# Patient Record
Sex: FEMALE | Race: BLACK OR AFRICAN AMERICAN | NOT HISPANIC OR LATINO | Employment: FULL TIME | URBAN - METROPOLITAN AREA
[De-identification: names, ages, dates, MRNs, and addresses within clinical notes are randomized per-mention and may not be internally consistent; named-entity substitution may affect disease eponyms.]

---

## 2022-12-13 ENCOUNTER — HOSPITAL ENCOUNTER (EMERGENCY)
Facility: HOSPITAL | Age: 22
Discharge: HOME | End: 2022-12-13
Attending: EMERGENCY MEDICINE

## 2022-12-13 ENCOUNTER — APPOINTMENT (EMERGENCY)
Dept: RADIOLOGY | Facility: HOSPITAL | Age: 22
End: 2022-12-13

## 2022-12-13 VITALS
RESPIRATION RATE: 16 BRPM | WEIGHT: 225 LBS | SYSTOLIC BLOOD PRESSURE: 131 MMHG | HEIGHT: 64 IN | OXYGEN SATURATION: 100 % | HEART RATE: 65 BPM | BODY MASS INDEX: 38.41 KG/M2 | TEMPERATURE: 98.5 F | DIASTOLIC BLOOD PRESSURE: 75 MMHG

## 2022-12-13 DIAGNOSIS — M62.838 MUSCLE SPASM: Primary | ICD-10-CM

## 2022-12-13 RX ORDER — CYCLOBENZAPRINE HCL 10 MG
10 TABLET ORAL 2 TIMES DAILY PRN
Qty: 20 TABLET | Refills: 0 | Status: SHIPPED | OUTPATIENT
Start: 2022-12-13

## 2022-12-13 RX ORDER — IBUPROFEN 400 MG/1
400 TABLET ORAL ONCE
Status: COMPLETED | OUTPATIENT
Start: 2022-12-13 | End: 2022-12-13

## 2022-12-13 RX ORDER — METHOCARBAMOL 500 MG/1
500 TABLET, FILM COATED ORAL ONCE
Status: COMPLETED | OUTPATIENT
Start: 2022-12-13 | End: 2022-12-13

## 2022-12-13 RX ADMIN — METHOCARBAMOL 500 MG: 500 TABLET ORAL at 14:56

## 2022-12-13 RX ADMIN — IBUPROFEN 400 MG: 400 TABLET, FILM COATED ORAL at 14:56

## 2022-12-13 NOTE — Clinical Note
Bronson Barajas was seen and treated in our emergency department on 12/13/2022  No restrictions            Diagnosis:     Carito Negron  may return to school on return date  She may return on this date: If you have any questions or concerns, please don't hesitate to call        Yaw Donis PA-C    ______________________________           _______________          _______________  Hospital Representative                              Date                                Time

## 2022-12-15 NOTE — ED PROVIDER NOTES
History  Chief Complaint   Patient presents with   • Neck Pain     Pt presents with right sided neck pain that started yesterday  States she was in the shower when she felt a "shooting pain" down right side of neck  Denies injury  Denies headache  Took two dose of 1000mg of tylenol today at 9am and 11am  Denies headache     66-year-old female presenting with neck pain on the right side of her neck the last 36 hours  Patient states that she took Tylenol which seemed to kind of help but states that is not going away  Patient states that she was recently on a plane for an extended amount of time and had her neck in an odd position which she thinks is causing the pain  Denies any midline tenderness or any traumas or car accidents  Patient states that she felt a mild discomfort last night but woke up this morning with much worse pain  Nuys any fevers, shortness of breath, chest pain, sore throat, headache, dental pain, or any other symptoms at this time  None       History reviewed  No pertinent past medical history  History reviewed  No pertinent surgical history  History reviewed  No pertinent family history  I have reviewed and agree with the history as documented  E-Cigarette/Vaping     E-Cigarette/Vaping Substances     Social History     Tobacco Use   • Smoking status: Never   • Smokeless tobacco: Never   Substance Use Topics   • Alcohol use: Never   • Drug use: Never       Review of Systems   Constitutional: Negative for chills and fever  HENT: Negative for ear pain and sore throat  Eyes: Negative for pain and visual disturbance  Respiratory: Negative for cough and shortness of breath  Cardiovascular: Negative for chest pain and palpitations  Gastrointestinal: Negative for abdominal pain, constipation, diarrhea, nausea and vomiting  Genitourinary: Negative for dysuria and hematuria  Musculoskeletal: Positive for neck pain   Negative for arthralgias, back pain, gait problem, joint swelling, myalgias and neck stiffness  Skin: Negative for color change and rash  Neurological: Negative for dizziness, seizures, syncope, weakness, light-headedness, numbness and headaches  All other systems reviewed and are negative  Physical Exam  Physical Exam  Vitals and nursing note reviewed  Constitutional:       General: She is not in acute distress  Appearance: She is well-developed  HENT:      Head: Normocephalic and atraumatic  Eyes:      Conjunctiva/sclera: Conjunctivae normal    Neck:      Vascular: No carotid bruit  Comments: Pain with movements  Cardiovascular:      Rate and Rhythm: Normal rate and regular rhythm  Heart sounds: No murmur heard  Pulmonary:      Effort: Pulmonary effort is normal  No respiratory distress  Breath sounds: Normal breath sounds  No stridor  No wheezing, rhonchi or rales  Chest:      Chest wall: No tenderness  Abdominal:      Palpations: Abdomen is soft  Tenderness: There is no abdominal tenderness  Musculoskeletal:         General: No swelling  Cervical back: Normal range of motion and neck supple  Tenderness present  No rigidity  Right lower leg: No edema  Left lower leg: No edema  Lymphadenopathy:      Cervical: No cervical adenopathy  Skin:     General: Skin is warm and dry  Capillary Refill: Capillary refill takes less than 2 seconds  Coloration: Skin is not jaundiced or pale  Findings: No bruising, erythema, lesion or rash  Neurological:      Mental Status: She is alert     Psychiatric:         Mood and Affect: Mood normal          Vital Signs  ED Triage Vitals [12/13/22 1348]   Temperature Pulse Respirations Blood Pressure SpO2   98 5 °F (36 9 °C) 65 16 131/75 100 %      Temp src Heart Rate Source Patient Position - Orthostatic VS BP Location FiO2 (%)   -- Monitor Sitting Right arm --      Pain Score       10 - Worst Possible Pain           Vitals:    12/13/22 1348   BP: 131/75 Pulse: 65   Patient Position - Orthostatic VS: Sitting         Visual Acuity      ED Medications  Medications   methocarbamol (ROBAXIN) tablet 500 mg (500 mg Oral Given 12/13/22 1456)   ibuprofen (MOTRIN) tablet 400 mg (400 mg Oral Given 12/13/22 1456)       Diagnostic Studies  Results Reviewed     None                 XR spine cervical 2 or 3 vw injury   ED Interpretation by Twila Culver PA-C (12/13 5461)   No acute fractures      Final Result by Elda Bui MD (12/14 6937)      No acute osseous abnormality  Workstation performed: [id number]                    Procedures  Procedures         ED Course                                             MDM  Number of Diagnoses or Management Options  Muscle spasm  Diagnosis management comments: 6year-old female presenting with right-sided neck pain the last 24 to 36 hours  After not playing and having her neck and where position for  Amount of time  Straight to rule out fractures  Trays showed no acute fractures  Has tense muscles on the right side of her neck to the left side and is painful to palpation  Worse and only specific movements towards the right side  Patient states that she feels much better asked to her Robaxin and ibuprofen  Will prescribe patient these medications for the home  Patient's vitals, lab/imaging results, diagnosis, and treatment plan were discussed with the patient  All new/changed medications were discussed with patient, specifically, route of administration, how often and when to take, and where they can be picked up  Strict return precautions as well as close follow up with PCP was discussed with the patient and the patient was agreeable to my recommendations  Patient verbally acknowledged understanding of the above communications        Disposition  Final diagnoses:   Muscle spasm     Time reflects when diagnosis was documented in both MDM as applicable and the Disposition within this note     Time User Action Codes Description Comment    12/13/2022  3:57 PM Oscar Rodgers Add [E50 238] Muscle spasm       ED Disposition     ED Disposition   Discharge    Condition   Stable    Date/Time   Tue Dec 13, 2022  3:55 PM    Comment   Reina Martínez discharge to home/self care  Follow-up Information    None         Discharge Medication List as of 12/13/2022  4:00 PM      START taking these medications    Details   cyclobenzaprine (FLEXERIL) 10 mg tablet Take 1 tablet (10 mg total) by mouth 2 (two) times a day as needed for muscle spasms, Starting Tue 12/13/2022, Normal             No discharge procedures on file      PDMP Review     None          ED Provider  Electronically Signed by           Debra Lucio PA-C  12/14/22 8688

## 2024-12-17 ENCOUNTER — APPOINTMENT (OUTPATIENT)
Dept: RADIOLOGY | Facility: HOSPITAL | Age: 24
End: 2024-12-17

## 2024-12-17 ENCOUNTER — HOSPITAL ENCOUNTER (EMERGENCY)
Facility: HOSPITAL | Age: 24
Discharge: HOME | End: 2024-12-17
Attending: EMERGENCY MEDICINE

## 2024-12-17 VITALS
DIASTOLIC BLOOD PRESSURE: 75 MMHG | TEMPERATURE: 98.2 F | OXYGEN SATURATION: 100 % | SYSTOLIC BLOOD PRESSURE: 103 MMHG | WEIGHT: 237.8 LBS | HEIGHT: 64 IN | RESPIRATION RATE: 18 BRPM | BODY MASS INDEX: 40.6 KG/M2 | HEART RATE: 64 BPM

## 2024-12-17 DIAGNOSIS — D64.9 ANEMIA: ICD-10-CM

## 2024-12-17 DIAGNOSIS — R07.9 ACUTE CHEST PAIN: Primary | ICD-10-CM

## 2024-12-17 LAB
ALBUMIN SERPL BCG-MCNC: 4.4 G/DL (ref 3.5–5)
ALP SERPL-CCNC: 51 U/L (ref 34–104)
ALT SERPL W P-5'-P-CCNC: 9 U/L (ref 7–52)
ANION GAP SERPL CALCULATED.3IONS-SCNC: 5 MMOL/L (ref 4–13)
AST SERPL W P-5'-P-CCNC: 14 U/L (ref 13–39)
BASOPHILS # BLD AUTO: 0.02 THOUSANDS/ÂΜL (ref 0–0.1)
BASOPHILS NFR BLD AUTO: 0 % (ref 0–1)
BILIRUB SERPL-MCNC: 0.63 MG/DL (ref 0.2–1)
BUN SERPL-MCNC: 9 MG/DL (ref 5–25)
CALCIUM SERPL-MCNC: 9.2 MG/DL (ref 8.4–10.2)
CARDIAC TROPONIN I PNL SERPL HS: <2 NG/L (ref ?–50)
CARDIAC TROPONIN I PNL SERPL HS: <2 NG/L (ref ?–50)
CHLORIDE SERPL-SCNC: 106 MMOL/L (ref 96–108)
CO2 SERPL-SCNC: 28 MMOL/L (ref 21–32)
CREAT SERPL-MCNC: 0.7 MG/DL (ref 0.6–1.3)
EOSINOPHIL # BLD AUTO: 0.16 THOUSAND/ÂΜL (ref 0–0.61)
EOSINOPHIL NFR BLD AUTO: 2 % (ref 0–6)
ERYTHROCYTE [DISTWIDTH] IN BLOOD BY AUTOMATED COUNT: 12.6 % (ref 11.6–15.1)
EXT PREGNANCY TEST URINE: NEGATIVE
EXT. CONTROL: NORMAL
GFR SERPL CREATININE-BSD FRML MDRD: 121 ML/MIN/1.73SQ M
GLUCOSE SERPL-MCNC: 78 MG/DL (ref 65–140)
HCT VFR BLD AUTO: 33.2 % (ref 34.8–46.1)
HGB BLD-MCNC: 10.7 G/DL (ref 11.5–15.4)
IMM GRANULOCYTES # BLD AUTO: 0.02 THOUSAND/UL (ref 0–0.2)
IMM GRANULOCYTES NFR BLD AUTO: 0 % (ref 0–2)
LYMPHOCYTES # BLD AUTO: 1.92 THOUSANDS/ÂΜL (ref 0.6–4.47)
LYMPHOCYTES NFR BLD AUTO: 27 % (ref 14–44)
MCH RBC QN AUTO: 29.8 PG (ref 26.8–34.3)
MCHC RBC AUTO-ENTMCNC: 32.2 G/DL (ref 31.4–37.4)
MCV RBC AUTO: 93 FL (ref 82–98)
MONOCYTES # BLD AUTO: 0.48 THOUSAND/ÂΜL (ref 0.17–1.22)
MONOCYTES NFR BLD AUTO: 7 % (ref 4–12)
NEUTROPHILS # BLD AUTO: 4.56 THOUSANDS/ÂΜL (ref 1.85–7.62)
NEUTS SEG NFR BLD AUTO: 64 % (ref 43–75)
NRBC BLD AUTO-RTO: 0 /100 WBCS
PLATELET # BLD AUTO: 312 THOUSANDS/UL (ref 149–390)
PMV BLD AUTO: 10 FL (ref 8.9–12.7)
POTASSIUM SERPL-SCNC: 3.8 MMOL/L (ref 3.5–5.3)
PROT SERPL-MCNC: 7.2 G/DL (ref 6.4–8.4)
RBC # BLD AUTO: 3.59 MILLION/UL (ref 3.81–5.12)
SODIUM SERPL-SCNC: 139 MMOL/L (ref 135–147)
WBC # BLD AUTO: 7.16 THOUSAND/UL (ref 4.31–10.16)

## 2024-12-17 PROCEDURE — 81025 URINE PREGNANCY TEST: CPT | Performed by: EMERGENCY MEDICINE

## 2024-12-17 PROCEDURE — 93005 ELECTROCARDIOGRAM TRACING: CPT

## 2024-12-17 PROCEDURE — 99285 EMERGENCY DEPT VISIT HI MDM: CPT | Performed by: EMERGENCY MEDICINE

## 2024-12-17 PROCEDURE — 71045 X-RAY EXAM CHEST 1 VIEW: CPT

## 2024-12-17 PROCEDURE — 99285 EMERGENCY DEPT VISIT HI MDM: CPT

## 2024-12-17 PROCEDURE — 85025 COMPLETE CBC W/AUTO DIFF WBC: CPT | Performed by: EMERGENCY MEDICINE

## 2024-12-17 PROCEDURE — 84484 ASSAY OF TROPONIN QUANT: CPT | Performed by: EMERGENCY MEDICINE

## 2024-12-17 PROCEDURE — 80053 COMPREHEN METABOLIC PANEL: CPT | Performed by: EMERGENCY MEDICINE

## 2024-12-17 PROCEDURE — 96374 THER/PROPH/DIAG INJ IV PUSH: CPT

## 2024-12-17 PROCEDURE — 36415 COLL VENOUS BLD VENIPUNCTURE: CPT

## 2024-12-17 RX ORDER — LIDOCAINE 50 MG/G
1 PATCH TOPICAL ONCE
Status: DISCONTINUED | OUTPATIENT
Start: 2024-12-17 | End: 2024-12-18 | Stop reason: HOSPADM

## 2024-12-17 RX ORDER — KETOROLAC TROMETHAMINE 30 MG/ML
15 INJECTION, SOLUTION INTRAMUSCULAR; INTRAVENOUS ONCE
Status: COMPLETED | OUTPATIENT
Start: 2024-12-17 | End: 2024-12-17

## 2024-12-17 RX ADMIN — LIDOCAINE 1 PATCH: 700 PATCH TOPICAL at 22:23

## 2024-12-17 RX ADMIN — KETOROLAC TROMETHAMINE 15 MG: 30 INJECTION, SOLUTION INTRAMUSCULAR; INTRAVENOUS at 22:22

## 2024-12-18 NOTE — ED PROVIDER NOTES
Time reflects when diagnosis was documented in both MDM as applicable and the Disposition within this note       Time User Action Codes Description Comment    12/17/2024 10:15 PM Melvin Capellan Add [R07.9] Acute chest pain     12/17/2024 10:15 PM IoanadevinMelvin Add [D64.9] Anemia           ED Disposition       ED Disposition   Discharge    Condition   Stable    Date/Time   Tue Dec 17, 2024 10:17 PM    Comment   Abby Hercules discharge to home/self care.                   Assessment & Plan       Medical Decision Making  Patient presents with:  Midsternal chest pain that radiates across her chest.  The chest pain started at 11 AM this morning while lifting light boxes and seasonings.  The pain has been constant all day.  Patient has not taken anything for pain.  Patient rates the pain as a 5 out of 10 and worsens with any type of push-up motion.  Past medical history includes PCOS.  Denies taking any medications.  Patient smokes hookah 2 times a week since she was 18 years old and drinks 1 to 2 glasses of alcohol every weekend.  Denies any illicit substances.  Patient denies any recent sick contacts.    Patient seen and examined noted to have reproducible chest pain with pressure along the sternal border.      Differential diagnosis includes but is not limited to costochondritis, ACS, electrolyte abnormality, pneumonia.      Patient's labs notable for: Unremarkable CBC, CMP, troponin, urine pregnancy, Imaging revealed: No acute cardiopulmonary disease, and EKG: interpreted by myself as above.    Heart Score: 3     Patient appears well, is nontoxic appearing, Abbyaletha Hercules expresses understanding and agrees with plan of care at this time.  In light of this patient would benefit from outpatient management.    Patient was rx'd as below       Amount and/or Complexity of Data Reviewed  Labs: ordered.  Radiology: ordered and independent interpretation performed.    Risk  Prescription drug  management.             Medications   ketorolac (TORADOL) injection 15 mg (15 mg Intravenous Given 12/17/24 2222)       ED Risk Strat Scores   HEART Risk Score      Flowsheet Row Most Recent Value   Heart Score Risk Calculator    History 1 Filed at: 12/17/2024 2215   ECG 1 Filed at: 12/17/2024 2215   Age 0 Filed at: 12/17/2024 2215   Risk Factors 1 Filed at: 12/17/2024 2215   Troponin 0 Filed at: 12/17/2024 2215   HEART Score 3 Filed at: 12/17/2024 2215          HEART Risk Score      Flowsheet Row Most Recent Value   Heart Score Risk Calculator    History 1 Filed at: 12/17/2024 2215   ECG 1 Filed at: 12/17/2024 2215   Age 0 Filed at: 12/17/2024 2215   Risk Factors 1 Filed at: 12/17/2024 2215   Troponin 0 Filed at: 12/17/2024 2215   HEART Score 3 Filed at: 12/17/2024 2215                        PERC Rule for PE      Flowsheet Row Most Recent Value   PERC Rule for PE    Age >=50 0 Filed at: 12/17/2024 2215   HR >=100 0 Filed at: 12/17/2024 2215   O2 Sat on room air < 95% 0 Filed at: 12/17/2024 2215   History of PE or DVT 0 Filed at: 12/17/2024 2215   Recent trauma or surgery 0 Filed at: 12/17/2024 2215   Hemoptysis 0 Filed at: 12/17/2024 2215   Exogenous estrogen 0 Filed at: 12/17/2024 2215   Unilateral leg swelling 0 Filed at: 12/17/2024 2215   PERC Rule for PE Results 0 Filed at: 12/17/2024 2215                Wells' Criteria for PE      Flowsheet Row Most Recent Value   Wells' Criteria for PE    Clinical signs and symptoms of DVT 0 Filed at: 12/17/2024 2215   PE is primary diagnosis or equally likely 0 Filed at: 12/17/2024 2215   HR >100 0 Filed at: 12/17/2024 2215   Immobilization at least 3 days or Surgery in the previous 4 weeks 0 Filed at: 12/17/2024 2215   Previous, objectively diagnosed PE or DVT 0 Filed at: 12/17/2024 2215   Hemoptysis 0 Filed at: 12/17/2024 2215   Malignancy with treatment within 6 months or palliative 0 Filed at: 12/17/2024 2215   Wells' Criteria Total 0 Filed at: 12/17/2024 2215                         History of Present Illness       Chief Complaint   Patient presents with    Chest Pain     Mid sternal chest pain that radiates to her back starting at 1100 this am, reports pain constant all day and tonight after lifting a light box became sharp. No meds today       History reviewed. No pertinent past medical history.   History reviewed. No pertinent surgical history.   History reviewed. No pertinent family history.   Social History     Tobacco Use    Smoking status: Never    Smokeless tobacco: Never   Vaping Use    Vaping status: Some Days   Substance Use Topics    Alcohol use: Yes     Comment: occ    Drug use: Never      E-Cigarette/Vaping    E-Cigarette Use Current Some Day User     Comments hooka       E-Cigarette/Vaping Substances      I have reviewed and agree with the history as documented.     Abby Hercules is a 24 y.o. female     They presented to the emergency department on December 19, 2024. Patient presents with:  Midsternal chest pain that radiates across her chest.  The chest pain started at 11 AM this morning while lifting light boxes and seasonings.  The pain has been constant all day.  Patient has not taken anything for pain.  Patient rates the pain as a 5 out of 10 and worsens with any type of push-up motion.  Past medical history includes PCOS.  Denies taking any medications.  Patient smokes hookah 2 times a week since she was 18 years old and drinks 1 to 2 glasses of alcohol every weekend.  Denies any illicit substances.  Patient denies any recent sick contacts.  Patient denies any fever, chills, cough, shortness of breath, palpitations, nausea, vomiting, diarrhea, constipation, dysuria, polyuria, hematuria, rash or any other complaint at this time.                    Review of Systems   Constitutional:  Negative for chills and fever.   HENT:  Negative for ear pain and sore throat.    Eyes:  Negative for pain and visual disturbance.   Respiratory:  Negative for cough and  shortness of breath.    Cardiovascular:  Positive for chest pain. Negative for palpitations.   Gastrointestinal:  Negative for abdominal pain, constipation, diarrhea, nausea and vomiting.   Genitourinary:  Negative for dysuria and hematuria.   Musculoskeletal:  Negative for arthralgias and back pain.   Skin:  Negative for color change and rash.   Neurological:  Negative for seizures and syncope.   All other systems reviewed and are negative.          Objective       ED Triage Vitals   Temperature Pulse Blood Pressure Respirations SpO2 Patient Position - Orthostatic VS   12/17/24 1736 12/17/24 1733 12/17/24 1736 12/17/24 1733 12/17/24 1733 12/17/24 2045   98.2 °F (36.8 °C) 66 131/70 22 100 % Lying      Temp Source Heart Rate Source BP Location FiO2 (%) Pain Score    12/17/24 1736 12/17/24 2045 12/17/24 2045 -- 12/17/24 1733    Oral Monitor Right arm  8      Vitals      Date and Time Temp Pulse SpO2 Resp BP Pain Score FACES Pain Rating User   12/17/24 2045 -- 64 100 % 18 103/75 6 -- JR   12/17/24 1736 98.2 °F (36.8 °C) -- -- -- 131/70 -- -- VB   12/17/24 1733 -- 66 100 % 22 -- 8 -- VB            Physical Exam  Vitals and nursing note reviewed.   Constitutional:       General: She is not in acute distress.     Appearance: She is well-developed.   HENT:      Head: Normocephalic and atraumatic.   Eyes:      Conjunctiva/sclera: Conjunctivae normal.   Cardiovascular:      Rate and Rhythm: Normal rate and regular rhythm.      Heart sounds: Normal heart sounds. Heart sounds not distant. No murmur heard.     No systolic murmur is present.      No diastolic murmur is present.      No friction rub. No gallop. No S3 or S4 sounds.      Comments: Chest pain is reproducible with pressure along the sternal border.  Pulmonary:      Effort: Pulmonary effort is normal. No respiratory distress.      Breath sounds: Normal breath sounds. No decreased breath sounds, wheezing, rhonchi or rales.   Abdominal:      Palpations: Abdomen is soft.       Tenderness: There is no abdominal tenderness.   Musculoskeletal:         General: No swelling.      Cervical back: Neck supple.   Skin:     General: Skin is warm and dry.      Capillary Refill: Capillary refill takes less than 2 seconds.   Neurological:      Mental Status: She is alert.   Psychiatric:         Mood and Affect: Mood normal.         Results Reviewed       Procedure Component Value Units Date/Time    POCT pregnancy, urine [[id number]]  (Normal) Collected: 12/17/24 2219    Lab Status: Final result Updated: 12/17/24 2220     EXT Preg Test, Ur Negative     Control Valid    HS Troponin I 2hr [[id number]] Collected: 12/17/24 1952    Lab Status: Final result Specimen: Blood from Arm, Left Updated: 12/17/24 2028     hs TnI 2hr <2 ng/L      Delta 2hr hsTnI --    HS Troponin 0hr (reflex protocol) [[id number]]  (Normal) Collected: 12/17/24 1742    Lab Status: Final result Specimen: Blood from Arm, Left Updated: 12/17/24 1809     hs TnI 0hr <2 ng/L     Comprehensive metabolic panel [[id number]] Collected: 12/17/24 1742    Lab Status: Final result Specimen: Blood from Arm, Left Updated: 12/17/24 1802     Sodium 139 mmol/L      Potassium 3.8 mmol/L      Chloride 106 mmol/L      CO2 28 mmol/L      ANION GAP 5 mmol/L      BUN 9 mg/dL      Creatinine 0.70 mg/dL      Glucose 78 mg/dL      Calcium 9.2 mg/dL      AST 14 U/L      ALT 9 U/L      Alkaline Phosphatase 51 U/L      Total Protein 7.2 g/dL      Albumin 4.4 g/dL      Total Bilirubin 0.63 mg/dL      eGFR 121 ml/min/1.73sq m     Narrative:      National Kid[employer] Disease Foundation guidelines for Chronic Kidney Disease (CKD):     Stage 1 with normal or high GFR (GFR > 90 mL/min/1.73 square meters)    Stage 2 Mild CKD (GFR = 60-89 mL/min/1.73 square meters)    Stage 3A Moderate CKD (GFR = 45-59 mL/min/1.73 square meters)    Stage 3B Moderate CKD (GFR = 30-44 mL/min/1.73 square meters)    Stage 4 Severe CKD (GFR = 15-29 mL/min/1.73 square meters)    Stage 5 End Stage  CKD (GFR <15 mL/min/1.73 square meters)  Note: GFR calculation is accurate only with a steady state creatinine    CBC and differential [[id number]]  (Abnormal) Collected: 12/17/24 1742    Lab Status: Final result Specimen: Blood from Arm, Left Updated: 12/17/24 1748     WBC 7.16 Thousand/uL      RBC 3.59 Million/uL      Hemoglobin 10.7 g/dL      Hematocrit 33.2 %      MCV 93 fL      MCH 29.8 pg      MCHC 32.2 g/dL      RDW 12.6 %      MPV 10.0 fL      Platelets 312 Thousands/uL      nRBC 0 /100 WBCs      Segmented % 64 %      Immature Grans % 0 %      Lymphocytes % 27 %      Monocytes % 7 %      Eosinophils Relative 2 %      Basophils Relative 0 %      Absolute Neutrophils 4.56 Thousands/µL      Absolute Immature Grans 0.02 Thousand/uL      Absolute Lymphocytes 1.92 Thousands/µL      Absolute Monocytes 0.48 Thousand/µL      Eosinophils Absolute 0.16 Thousand/µL      Basophils Absolute 0.02 Thousands/µL             XR chest 1 view portable   ED Interpretation by Melvin Capellan MD (12/17 2216)   Per my independent interpretation: No acute cardiopulmonary process      Final Interpretation by Bird Myers MD (12/18 [id number])      No acute cardiopulmonary disease.            Workstation performed: [id number]             ECG 12 Lead Documentation Only    Date/Time: 12/17/2024 5:55 PM    Performed by: Jesus Bauer MD  Authorized by: Jesus Bauer MD    Indications / Diagnosis:  Chest pain  ECG reviewed by me, the ED Provider: yes    Patient location:  ED  Previous ECG:     Previous ECG:  Unavailable    Comparison to cardiac monitor: No    Interpretation:     Interpretation: non-specific    Rate:     ECG rate:  60    ECG rate assessment: normal    Rhythm:     Rhythm: sinus rhythm      Rhythm comment:  Sinus rhythm with sinus arrhythmia  Ectopy:     Ectopy: none    QRS:     QRS axis:  Normal    QRS intervals:  Normal  Conduction:     Conduction: normal    ST segments:     ST segments:  Normal  T waves:     T waves: normal         ED Medication and Procedure Management   Prior to Admission Medications   Prescriptions Last Dose Informant Patient Reported? Taking?   cyclobenzaprine (FLEXERIL) 10 mg tablet   No No   Sig: Take 1 tablet (10 mg total) by mouth 2 (two) times a day as needed for muscle spasms      Facility-Administered Medications: None     Discharge Medication List as of 12/17/2024 10:17 PM        CONTINUE these medications which have NOT CHANGED    Details   cyclobenzaprine (FLEXERIL) 10 mg tablet Take 1 tablet (10 mg total) by mouth 2 (two) times a day as needed for muscle spasms, Starting Tue 12/13/2022, Normal             ED SEPSIS DOCUMENTATION   Time reflects when diagnosis was documented in both MDM as applicable and the Disposition within this note       Time User Action Codes Description Comment    12/17/2024 10:15 PM Melvin Capellan Add [R07.9] Acute chest pain     12/17/2024 10:15 PM Melvin Capellan Add [D64.9] Anemia                  Jesus Bauer MD  12/19/24 7401

## 2024-12-18 NOTE — ED ATTENDING ATTESTATION
12/17/2024  I, Melvin Capellan MD, saw and evaluated the patient. I have discussed the patient with the resident/non-physician practitioner and agree with the resident's/non-physician practitioner's findings, Plan of Care, and MDM as documented in the resident's/non-physician practitioner's note, except where noted. All available labs and Radiology studies were reviewed.  I was present for key portions of any procedure(s) performed by the resident/non-physician practitioner and I was immediately available to provide assistance.       At this point I agree with the current assessment done in the Emergency Department.  I have conducted an independent evaluation of this patient a history and physical is as follows:    History    Patient is a 24-year-old female, with a history significant for vape use per my review the medical record, who presents to the ED today for evaluation of atraumatic chest pain that began at 11 AM today.  The pain has been constant since its onset.  The chest pain is nonexertional, nonpleuritic, not worsened with laying back.  There is no associated fever, recent URIs, dyspnea, abdominal pain, weakness, numbness, drug use, history of VTE, hemoptysis, recent trauma or surgery, contraceptive use.  Patient states that she was lifting something when her pain occurred.  Movement exacerbates her discomfort.  Patient has not taken anything to remit her symptoms.  Patient's mother, present in room providing collateral history, states there is family history of cardiac disease and patient is not confused.    Patient is without other concerns at this time.     ROS  Patient denies: Fever; dysphagia; vision change; dyspnea; abdominal pain; polyuria; dysuria; rash; weakness; numbness; difficulty walking; confusion    Physical Exam    GENERAL APPEARANCE: NAD  NEURO: Patient is speaking clearly in complete sentences.  Patient is answering appropriately and able follow commands.  Patient is moving  all four extremities spontaneously.  No facial droop.  Tongue midline.  HEENT: PERRL, Moist mucous membranes, external ears normal, nose normal  Neck: No cervical adenopathy  CV: RRR. No murmurs, rubs, gallops.  2+ radial and DP pulses bilaterally.  LUNGS: Clear to auscultation: No wheezes, stridor, rhonchi, rales  GI: Abdomen non-distended. Soft. Non-tender and without rebound or guarding   : Deferred at this time  MSK: Tenderness to palpation over the right chest wall.  No deformity.  No lower extremity edema.  No pain with calf squeeze.  Skin: Warm and dry  Capillary refill: <2 seconds    Patient is currently afebrile and hemodynamically stable.    Assessment/Plan/MDM  Chest pain  -This presentation is concerning for: ACS, pneumothorax, sprain, strain.  No reason to suspect aortic dissection, pericarditis, myocarditis, PE based on history, physical exam, PERC/Wells scores of 0.  -Will investigate with cardiac workup, pregnancy test  -Will manage with multimodal analgesia and further based on workup    ED Course  ED Course as of 12/17/24 2320   Tue Dec 17, 2024   2202 ECG per my independent interpretation: Normal rate, regular rhythm, no ectopy, normal axis, no ST elevations or depressions     2216 Patient states she does not have a PCP.  Referral provided   2220 PREGNANCY TEST URINE: Negative  WNL   2320 In regards to the anemia, patient denies blood in stool/hematuria.  She states that she recently finished her last menstrual cycle.         Critical Care Time  Procedures

## 2024-12-20 LAB
ATRIAL RATE: 60 BPM
P AXIS: 39 DEGREES
PR INTERVAL: 152 MS
QRS AXIS: 68 DEGREES
QRSD INTERVAL: 84 MS
QT INTERVAL: 414 MS
QTC INTERVAL: 414 MS
T WAVE AXIS: 4 DEGREES
VENTRICULAR RATE: 60 BPM

## 2024-12-20 PROCEDURE — 93010 ELECTROCARDIOGRAM REPORT: CPT | Performed by: INTERNAL MEDICINE

## 2025-01-15 ENCOUNTER — OFFICE VISIT (OUTPATIENT)
Dept: FAMILY MEDICINE CLINIC | Facility: CLINIC | Age: 25
End: 2025-01-15
Payer: COMMERCIAL

## 2025-01-15 VITALS
HEART RATE: 71 BPM | BODY MASS INDEX: 40.05 KG/M2 | WEIGHT: 234.6 LBS | RESPIRATION RATE: 16 BRPM | TEMPERATURE: 97.9 F | OXYGEN SATURATION: 100 % | HEIGHT: 64 IN | DIASTOLIC BLOOD PRESSURE: 73 MMHG | SYSTOLIC BLOOD PRESSURE: 134 MMHG

## 2025-01-15 DIAGNOSIS — Z11.4 SCREENING FOR HIV (HUMAN IMMUNODEFICIENCY VIRUS): ICD-10-CM

## 2025-01-15 DIAGNOSIS — D64.9 ANEMIA, UNSPECIFIED TYPE: ICD-10-CM

## 2025-01-15 DIAGNOSIS — Z11.59 NEED FOR HEPATITIS C SCREENING TEST: ICD-10-CM

## 2025-01-15 DIAGNOSIS — Z23 NEED FOR VACCINATION: ICD-10-CM

## 2025-01-15 DIAGNOSIS — Z11.3 SCREENING FOR STDS (SEXUALLY TRANSMITTED DISEASES): ICD-10-CM

## 2025-01-15 DIAGNOSIS — Z13.1 SCREENING FOR DIABETES MELLITUS: ICD-10-CM

## 2025-01-15 DIAGNOSIS — Z00.00 ANNUAL PHYSICAL EXAM: Primary | ICD-10-CM

## 2025-01-15 DIAGNOSIS — E04.1 NODULAR THYROID DISEASE: ICD-10-CM

## 2025-01-15 DIAGNOSIS — E66.01 MORBID OBESITY WITH BMI OF 40.0-44.9, ADULT (HCC): ICD-10-CM

## 2025-01-15 DIAGNOSIS — R07.9 ACUTE CHEST PAIN: ICD-10-CM

## 2025-01-15 DIAGNOSIS — E28.2 PCOS (POLYCYSTIC OVARIAN SYNDROME): ICD-10-CM

## 2025-01-15 DIAGNOSIS — Z12.4 SCREENING FOR CERVICAL CANCER: ICD-10-CM

## 2025-01-15 PROBLEM — Z30.42 ENCOUNTER FOR SURVEILLANCE OF INJECTABLE CONTRACEPTIVE: Status: ACTIVE | Noted: 2021-02-23

## 2025-01-15 PROBLEM — Z30.09 GENERAL COUNSELING AND ADVICE FOR CONTRACEPTIVE MANAGEMENT: Status: ACTIVE | Noted: 2018-07-19

## 2025-01-15 PROBLEM — R10.2 PELVIC PAIN IN FEMALE: Status: ACTIVE | Noted: 2021-12-02

## 2025-01-15 PROCEDURE — 99385 PREV VISIT NEW AGE 18-39: CPT | Performed by: FAMILY MEDICINE

## 2025-01-15 PROCEDURE — 99204 OFFICE O/P NEW MOD 45 MIN: CPT | Performed by: FAMILY MEDICINE

## 2025-01-15 NOTE — PATIENT INSTRUCTIONS
"Patient Education     Routine physical for adults   The Basics   Written by the doctors and editors at [facility]   What is a physical? -- A physical is a routine visit, or \"check-up,\" with your doctor. You might also hear it called a \"wellness visit\" or \"preventive visit.\"  During each visit, the doctor will:   Ask about your physical and mental health   Ask about your habits, behaviors, and lifestyle   Do an exam   Give you vaccines if needed   Talk to you about any medicines you take   Give advice about your health   Answer your questions  Getting regular check-ups is an important part of taking care of your health. It can help your doctor find and treat any problems you have. But it's also important for preventing health problems.  A routine physical is different from a \"sick visit.\" A sick visit is when you see a doctor because of a health concern or problem. Since physicals are scheduled ahead of time, you can think about what you want to ask the doctor.  How often should I get a physical? -- It depends on your age and health. In general, for people age 21 years and older:   If you are younger than 50 years, you might be able to get a physical every 3 years.   If you are 50 years or older, your doctor might recommend a physical every year.  If you have an ongoing health condition, like diabetes or high blood pressure, your doctor will probably want to see you more often.  What happens during a physical? -- In general, each visit will include:   Physical exam - The doctor or nurse will check your height, weight, heart rate, and blood pressure. They will also look at your eyes and ears. They will ask about how you are feeling and whether you have any symptoms that bother you.   Medicines - It's a good idea to bring a list of all the medicines you take to each doctor visit. Your doctor will talk to you about your medicines and answer any questions. Tell them if you are having any side effects that bother you. You " "should also tell them if you are having trouble paying for any of your medicines.   Habits and behaviors - This includes:   Your diet   Your exercise habits   Whether you smoke, drink alcohol, or use drugs   Whether you are sexually active   Whether you feel safe at home  Your doctor will talk to you about things you can do to improve your health and lower your risk of health problems. They will also offer help and support. For example, if you want to quit smoking, they can give you advice and might prescribe medicines. If you want to improve your diet or get more physical activity, they can help you with this, too.   Lab tests, if needed - The tests you get will depend on your age and situation. For example, your doctor might want to check your:   Cholesterol   Blood sugar   Iron level   Vaccines - The recommended vaccines will depend on your age, health, and what vaccines you already had. Vaccines are very important because they can prevent certain serious or deadly infections.   Discussion of screening - \"Screening\" means checking for diseases or other health problems before they cause symptoms. Your doctor can recommend screening based on your age, risk, and preferences. This might include tests to check for:   Cancer, such as breast, prostate, cervical, ovarian, colorectal, prostate, lung, or skin cancer   Sexually transmitted infections, such as chlamydia and gonorrhea   Mental health conditions like depression and anxiety  Your doctor will talk to you about the different types of screening tests. They can help you decide which screenings to have. They can also explain what the results might mean.   Answering questions - The physical is a good time to ask the doctor or nurse questions about your health. If needed, they can refer you to other doctors or specialists, too.  Adults older than 65 years often need other care, too. As you get older, your doctor will talk to you about:   How to prevent falling at " home   Hearing or vision tests   Memory testing   How to take your medicines safely   Making sure that you have the help and support you need at home  All topics are updated as new evidence becomes available and our peer review process is complete.  This topic retrieved from [employer] on: May 02, 2024.  Topic 466869 Version 1.0  Release: 32.4.3 - C32.122  © 2024 UpToDate, Inc. and/or its affiliates. All rights reserved.  Consumer Information Use and Disclaimer   Disclaimer: This generalized information is a limited summary of diagnosis, treatment, and/or medication information. It is not meant to be comprehensive and should be used as a tool to help the user understand and/or assess potential diagnostic and treatment options. It does NOT include all information about conditions, treatments, medications, side effects, or risks that may apply to a specific patient. It is not intended to be medical advice or a substitute for the medical advice, diagnosis, or treatment of a health care provider based on the health care provider's examination and assessment of a patient's specific and unique circumstances. Patients must speak with a health care provider for complete information about their health, medical questions, and treatment options, including any risks or benefits regarding use of medications. This information does not endorse any treatments or medications as safe, effective, or approved for treating a specific patient. UpToDate, Inc. and its affiliates disclaim any warranty or liability relating to this information or the use thereof.The use of this information is governed by the Terms of Use, available at https://www.wolters"[employer]uwer.com/en/know/clinical-effectiveness-terms. 2024© UpToDate, Inc. and its affiliates and/or licensors. All rights reserved.  Copyright   © 2024 UpToDate, Inc. and/or its affiliates. All rights reserved.

## 2025-01-15 NOTE — ASSESSMENT & PLAN NOTE
As per patient and her history.  Discussed with patient.  Patient was seen her GYN at the end of this month and advised her to discuss this with her also.  Orders:  •  Ambulatory referral to Obstetrics / Gynecology; Future

## 2025-01-15 NOTE — ASSESSMENT & PLAN NOTE
Discussed with patient.  Patient advised to lose weight with better diet and exercise.  Will check her labs.  And patient is referred to a nutritionist as well as weight management as discussed with patient.  Orders:  •  Hemoglobin A1C; Future  •  Ambulatory referral to Nutrition Services; Future  •  Ambulatory referral to Weight Management; Future

## 2025-01-15 NOTE — ASSESSMENT & PLAN NOTE
Recently diagnosed in the emergency room with slight low hemoglobin.  Patient without acute symptoms.  Discussed with patient.  Patient education.  Patient will first get the blood work below and then follow-up after as appropriate.  Orders:  •  CBC and differential; Future  •  Iron Panel (Includes Ferritin, Iron Sat%, Iron, and TIBC); Future

## 2025-01-15 NOTE — ASSESSMENT & PLAN NOTE
Discussed with patient.  Referral given to her for her GYN and she has an appointment with her GYN at the end of this month as per patient.  Orders:  •  Ambulatory referral to Obstetrics / Gynecology; Future

## 2025-01-15 NOTE — PROGRESS NOTES
Adult Annual Physical  Name: Abby Hercules      : 2000      MRN: [id number]  Encounter Provider: Julienne Beebe MD  Encounter Date: 1/15/2025   Encounter department: [facility]    Assessment & Plan  Annual physical exam  Regarding her annual physical patient is given age and diagnosis appropriate evaluation and care.  Patient is ordered the blood work and urine below which also includes a screening for diabetes as well as hepatitis C and HIV as well as STI testing.  Patient will be seeing her GYN for her annual/Pap also at the end of the month.  Patient advised to lose weight with a better diet and exercise.  Take a multivitamin.  Take vitamin D3 2000's daily.  STI counseling and prevention also advised.  And she is also advised regular self skin checks and use sunscreen.  Orders:  •  Lipid panel; Future  •  CBC and differential; Future  •  Comprehensive metabolic panel; Future  •  TSH, 3rd generation with Free T4 reflex; Future  •  hCG, quantitative; Future  •  UA w Reflex to Microscopic w Reflex to Culture; Future    Anemia, unspecified type  Recently diagnosed in the emergency room with slight low hemoglobin.  Patient without acute symptoms.  Discussed with patient.  Patient education.  Patient will first get the blood work below and then follow-up after as appropriate.  Orders:  •  CBC and differential; Future  •  Iron Panel (Includes Ferritin, Iron Sat%, Iron, and TIBC); Future    Acute chest pain  Status post an ER visit last month for this.  Acute workup negative.  And was found to have a musculoskeletal type of chest pain.  Her chest pain has since resolved.  And she has no symptoms.  Discussed with patient.  Patient education.  Orders:  •  Ambulatory Referral to [facility]    Nodular thyroid disease  Discussed with patient.  Patient will get a thyroid sonogram.  Patient also get TFTs with her next blood work.  Orders:  •  TSH, 3rd generation with Free T4  reflex; Future  •  US thyroid; Future    PCOS (polycystic ovarian syndrome)  As per patient and her history.  Discussed with patient.  Patient was seen her GYN at the end of this month and advised her to discuss this with her also.  Orders:  •  Ambulatory referral to Obstetrics / Gynecology; Future    Morbid obesity with BMI of 40.0-44.9, adult (HCC)  Discussed with patient.  Patient advised to lose weight with better diet and exercise.  Will check her labs.  And patient is referred to a nutritionist as well as weight management as discussed with patient.  Orders:  •  Hemoglobin A1C; Future  •  Ambulatory referral to Nutrition Services; Future  •  Ambulatory referral to Weight Management; Future    Screening for diabetes mellitus  Discussed with patient.  Patient advised to lose weight with better diet and exercise.  Advised less starches and sweets.  Orders:  •  Hemoglobin A1C; Future    Screening for cervical cancer  Discussed with patient.  Referral given to her for her GYN and she has an appointment with her GYN at the end of this month as per patient.  Orders:  •  Ambulatory referral to Obstetrics / Gynecology; Future    Screening for HIV (human immunodeficiency virus)  Discussed with patient.  Orders:  •  HIV 1/2 AB/AG w Reflex [facility] for 2 yr old and above; Future    Need for hepatitis C screening test  Discussed with patient.  Orders:  •  Hepatitis C antibody; Future    Screening for STDs (sexually transmitted diseases)  Discussed with patient.  Orders:  •  Chlamydia/GC amplified DNA by PCR; Future    Need for vaccination  Discussed with patient.  Of note patient declined the annual flu vaccine for today.  Orders:  •  tetanus-diphtheria-acellular pertussis (ADACEL) 5-2-15.5 LF-mcg/0.5 injection; Inject 0.5 mL into a muscle once for 1 dose        RTO 1 year for her annual physical do the blood work and urine before.  Patient can see me prior to that for thing else in between.        Immunizations and  preventive care screenings were discussed with patient today. Appropriate education was printed on patient's after visit summary.    Counseling:  Alcohol/drug use: discussed moderation in alcohol intake, the recommendations for healthy alcohol use, and avoidance of illicit drug use.  Dental Health: discussed importance of regular tooth brushing, flossing, and dental visits.  Injury prevention: discussed safety/seat belts, safety helmets, smoke detectors, carbon monoxide detectors, and smoking near bedding or upholstery.  Sexual health: discussed sexually transmitted diseases, partner selection, use of condoms, avoidance of unintended pregnancy, and contraceptive alternatives.  Exercise: the importance of regular exercise/physical activity was discussed. Recommend exercise 3-5 times per week for at least 30 minutes.       Depression Screening and Follow-up Plan: Patient was screened for depression during today's encounter. They screened negative with a PHQ-2 score of 0.    Tobacco Cessation Counseling: Tobacco cessation counseling was provided. The patient is sincerely urged to quit consumption of tobacco. She is ready to quit tobacco. Patient denies tobacco.        History of Present Illness     Adult Annual Physical:  Patient presents for annual physical. 24-year-old female, new patient, here to establish care.  Patient needs an annual physical.  Patient also like to be evaluated for anemia.  Last month she went to the emergency room to be evaluated for chest pain which worked up to be negative cardiologically and is then when they told her that she is anemic.  The chest pain was musculoskeletal and it was from a pulled muscle.  Patient no longer has any chest pain.  Patient denies pregnancy.  Patient denies tobacco.  Patient drinks social alcohol.  Patient denies drugs.  Patient declined the flu vaccine.  And patient is due for her Tdap vaccine.  No history of adverse reaction to vaccines in the past.  She also  "asserts that she has PCOS.  And will be seeing her gynecologist at the end of this month.  No history of an abnormal Pap or any STIs as per patient..     Diet and Physical Activity:  - Diet/Nutrition: well balanced diet.  - Exercise: no formal exercise and walking.    Depression Screening:  - PHQ-2 Score: 0    General Health:  - Sleep: sleeps well.  - Hearing: normal hearing bilateral ears.  - Vision: no vision problems and wears contacts.  - Dental: regular dental visits.    /GYN Health:  - Follows with GYN: yes.   - Menopause: premenopausal.   - History of STDs: no    Review of Systems   Constitutional:  Negative for activity change, appetite change, chills, fatigue, fever and unexpected weight change.   HENT:  Negative for congestion, dental problem, hearing loss and sore throat.    Eyes:  Negative for visual disturbance.   Respiratory:  Negative for cough and shortness of breath.    Cardiovascular:  Positive for chest pain. Negative for palpitations.   Gastrointestinal:  Negative for abdominal pain, blood in stool, constipation, diarrhea, nausea and vomiting.   Genitourinary:  Negative for dysuria and hematuria.   Musculoskeletal:  Negative for arthralgias.   Skin:  Negative for rash.   Neurological:  Negative for dizziness and headaches.   Psychiatric/Behavioral:  Negative for dysphoric mood. The patient is not nervous/anxious.          Objective   /73 (BP Location: Left arm, Patient Position: Sitting, Cuff Size: Adult)   Pulse 71   Temp 97.9 °F (36.6 °C) (Temporal)   Resp 16   Ht 5' 4\" (1.626 m)   Wt 106 kg (234 lb 9.6 oz)   SpO2 100%   BMI 40.27 kg/m²     Physical Exam  Vitals reviewed.   Constitutional:       General: She is not in acute distress.     Appearance: Normal appearance. She is obese. She is not ill-appearing.   HENT:      Head: Normocephalic and atraumatic.      Right Ear: Tympanic membrane, ear canal and external ear normal.      Left Ear: Tympanic membrane, ear canal and external " ear normal.      Mouth/Throat:      Mouth: Mucous membranes are moist.      Pharynx: Oropharynx is clear.   Eyes:      Extraocular Movements: Extraocular movements intact.      Conjunctiva/sclera: Conjunctivae normal.   Neck:      Vascular: No carotid bruit.      Comments: Thyroid slightly nodular on exam.  Cardiovascular:      Rate and Rhythm: Normal rate and regular rhythm.   Pulmonary:      Effort: Pulmonary effort is normal.      Breath sounds: Normal breath sounds.   Abdominal:      General: Bowel sounds are normal.      Palpations: Abdomen is soft.      Tenderness: There is no abdominal tenderness. There is no right CVA tenderness or left CVA tenderness.   Musculoskeletal:         General: No tenderness.      Right lower leg: No edema.      Left lower leg: No edema.      Comments: No calf tenderness bilateral.   Lymphadenopathy:      Cervical: No cervical adenopathy.   Skin:     General: Skin is warm.   Neurological:      General: No focal deficit present.      Mental Status: She is alert and oriented to person, place, and time.   Psychiatric:         Mood and Affect: Mood normal.         Behavior: Behavior normal.         Thought Content: Thought content normal.

## 2025-01-23 PROBLEM — Z11.3 SCREENING FOR STD (SEXUALLY TRANSMITTED DISEASE): Status: RESOLVED | Noted: 2020-02-20 | Resolved: 2025-01-23

## 2025-01-23 PROBLEM — Z12.4 SCREENING FOR CERVICAL CANCER: Status: RESOLVED | Noted: 2022-04-28 | Resolved: 2025-01-23

## 2025-01-23 PROBLEM — Z30.09 GENERAL COUNSELING AND ADVICE FOR CONTRACEPTIVE MANAGEMENT: Status: RESOLVED | Noted: 2018-07-19 | Resolved: 2025-01-23

## 2025-01-23 PROBLEM — Z30.42 ENCOUNTER FOR SURVEILLANCE OF INJECTABLE CONTRACEPTIVE: Status: RESOLVED | Noted: 2021-02-23 | Resolved: 2025-01-23

## 2025-01-31 ENCOUNTER — HOSPITAL ENCOUNTER (OUTPATIENT)
Dept: ULTRASOUND IMAGING | Facility: HOSPITAL | Age: 25
Discharge: HOME | End: 2025-01-31
Payer: COMMERCIAL

## 2025-01-31 DIAGNOSIS — E04.1 NODULAR THYROID DISEASE: ICD-10-CM

## 2025-01-31 PROCEDURE — 76536 US EXAM OF HEAD AND NECK: CPT

## 2025-02-07 ENCOUNTER — OFFICE VISIT (OUTPATIENT)
Dept: OBGYN CLINIC | Facility: CLINIC | Age: 25
End: 2025-02-07
Payer: COMMERCIAL

## 2025-02-07 VITALS
SYSTOLIC BLOOD PRESSURE: 112 MMHG | DIASTOLIC BLOOD PRESSURE: 78 MMHG | HEIGHT: 64 IN | WEIGHT: 237.4 LBS | BODY MASS INDEX: 40.53 KG/M2

## 2025-02-07 DIAGNOSIS — Z11.3 SCREENING FOR STDS (SEXUALLY TRANSMITTED DISEASES): ICD-10-CM

## 2025-02-07 DIAGNOSIS — Z01.411 ENCOUNTER FOR GYNECOLOGICAL EXAMINATION WITH ABNORMAL FINDING: Primary | ICD-10-CM

## 2025-02-07 DIAGNOSIS — N92.0 MENORRHAGIA WITH REGULAR CYCLE: ICD-10-CM

## 2025-02-07 PROCEDURE — 87491 CHLMYD TRACH DNA AMP PROBE: CPT | Performed by: PHYSICIAN ASSISTANT

## 2025-02-07 PROCEDURE — 87591 N.GONORRHOEAE DNA AMP PROB: CPT | Performed by: PHYSICIAN ASSISTANT

## 2025-02-07 PROCEDURE — S0610 ANNUAL GYNECOLOGICAL EXAMINA: HCPCS | Performed by: PHYSICIAN ASSISTANT

## 2025-02-07 PROCEDURE — G0145 SCR C/V CYTO,THINLAYER,RESCR: HCPCS | Performed by: PHYSICIAN ASSISTANT

## 2025-02-07 NOTE — PROGRESS NOTES
Assessment/Plan:      Diagnoses and all orders for this visit:    Encounter for gynecological examination with abnormal finding  -     Liquid-based pap, screening    Menorrhagia with regular cycle  -     Iron Panel (Includes Ferritin, Iron Sat%, Iron, and TIBC); Future    Screening for STDs (sexually transmitted diseases)  -     Chlamydia/GC amplified DNA by PCR        Pap and GC/chlamydia done.  We will call with STD testing results.  Concern for iron deficiency anemia.  Order for iron panel entered.  If ferritin is low, will need consult with hematology, as well as contraception for bleeding control.  Stressed consistent condom use for STD and pregnancy prevention.  F/u to depend on results.  Call with problems in the interim.    Subjective:     Patient ID: Abby Hercules is a 24 y.o. female.    Patient is here for yearly gyn exam.  She is new to our office today; transfer from [facility].  Last gyn exam was 3 years ago.  Patient was diagnosed with PCOS at age 21.  Currently, periods are regular every 28 days, and bleeding lasts for 5 days.  Patient is changing her pad every 45 minutes for the first 24 hours or so.  She denies severe cramping.  Was on Depo Provera 3 years ago, but stopped.  CBC in December showed H/H of 10.7/33.2.  Patient is not currently sexually active; requests STD screening.  She denies bowel/bladder changes, pelvic pain, bloating, abdominal pain, n/v, change in appetite, and thyroid disease.  Had labs ordered by her PCP in January, but has not gone yet.    Patient is performing self-breast exam.  Denies new masses, skin changes, nipple discharge, and pain/tenderness.      Review of Systems   Constitutional:  Negative for appetite change and unexpected weight change.   Cardiovascular:         No masses, skin changes, nipple discharge, and pain/tenderness.   Gastrointestinal:  Negative for abdominal distention, abdominal pain, constipation, diarrhea, nausea and vomiting.   Genitourinary:  Positive  "for menstrual problem (Meorrhagia). Negative for difficulty urinating, dysuria, frequency, genital sores, hematuria, pelvic pain, urgency, vaginal bleeding, vaginal discharge and vaginal pain.         Objective:  Visit Vitals  /78 (BP Location: Left arm, Patient Position: Sitting, Cuff Size: Adult)   Ht 5' 4\" (1.626 m)   Wt 108 kg (237 lb 6.4 oz)   LMP 01/24/2025 (Exact Date)   BMI 40.75 kg/m²   OB Status Having periods   Smoking Status Never   BSA 2.11 m²         Physical Exam  Vitals reviewed. Exam conducted with a chaperone present.   Constitutional:       Appearance: Normal appearance. She is well-developed. She is obese.   Neck:      Thyroid: No thyromegaly.   Pulmonary:      Effort: Pulmonary effort is normal.   Chest:   Breasts:     Breasts are symmetrical.      Right: Normal. No swelling, bleeding, inverted nipple, mass, nipple discharge, skin change or tenderness.      Left: Normal. No swelling, bleeding, inverted nipple, mass, nipple discharge, skin change or tenderness.   Abdominal:      General: There is no distension.      Palpations: Abdomen is soft.      Tenderness: There is no abdominal tenderness.   Genitourinary:     General: Normal vulva.      Pubic Area: No rash.       Labia:         Right: No rash, tenderness, lesion or injury.         Left: No rash, tenderness, lesion or injury.       Vagina: Normal. No vaginal discharge, erythema, tenderness or bleeding.      Cervix: Normal.      Uterus: Normal.       Adnexa: Right adnexa normal and left adnexa normal.        Right: No mass, tenderness or fullness.          Left: No mass, tenderness or fullness.     Musculoskeletal:      Cervical back: Neck supple.   Lymphadenopathy:      Cervical: No cervical adenopathy.      Upper Body:      Right upper body: No supraclavicular or axillary adenopathy.      Left upper body: No supraclavicular or axillary adenopathy.      Lower Body: No right inguinal adenopathy. No left inguinal adenopathy.   Skin:     " General: Skin is warm and dry.   Neurological:      Mental Status: She is alert and oriented to person, place, and time.   Psychiatric:         Behavior: Behavior normal. Behavior is cooperative.         Thought Content: Thought content normal.         Judgment: Judgment normal.

## 2025-02-07 NOTE — PATIENT INSTRUCTIONS
Go for blood work.    Practice consistent condom use for STD and pregnancy prevention.    Call with problems.

## 2025-02-08 LAB
C TRACH DNA SPEC QL NAA+PROBE: NEGATIVE
N GONORRHOEA DNA SPEC QL NAA+PROBE: NEGATIVE

## 2025-02-10 ENCOUNTER — RESULTS FOLLOW-UP (OUTPATIENT)
Dept: OBGYN CLINIC | Facility: CLINIC | Age: 25
End: 2025-02-10

## 2025-02-11 ENCOUNTER — RESULTS FOLLOW-UP (OUTPATIENT)
Dept: FAMILY MEDICINE CLINIC | Facility: CLINIC | Age: 25
End: 2025-02-11

## 2025-02-14 LAB
LAB AP GYN PRIMARY INTERPRETATION: NORMAL
Lab: NORMAL

## 2025-02-21 ENCOUNTER — APPOINTMENT (OUTPATIENT)
Dept: LAB | Facility: AMBULARY SURGERY CENTER | Age: 25
End: 2025-02-21
Payer: COMMERCIAL

## 2025-02-21 DIAGNOSIS — E66.01 MORBID OBESITY WITH BMI OF 40.0-44.9, ADULT (HCC): ICD-10-CM

## 2025-02-21 DIAGNOSIS — Z00.00 ANNUAL PHYSICAL EXAM: ICD-10-CM

## 2025-02-21 DIAGNOSIS — N92.0 MENORRHAGIA WITH REGULAR CYCLE: ICD-10-CM

## 2025-02-21 DIAGNOSIS — D64.9 ANEMIA, UNSPECIFIED TYPE: ICD-10-CM

## 2025-02-21 DIAGNOSIS — E04.1 NODULAR THYROID DISEASE: ICD-10-CM

## 2025-02-21 DIAGNOSIS — Z11.59 NEED FOR HEPATITIS C SCREENING TEST: ICD-10-CM

## 2025-02-21 DIAGNOSIS — Z13.1 SCREENING FOR DIABETES MELLITUS: ICD-10-CM

## 2025-02-21 DIAGNOSIS — Z11.3 SCREENING FOR STDS (SEXUALLY TRANSMITTED DISEASES): ICD-10-CM

## 2025-02-21 DIAGNOSIS — Z11.4 SCREENING FOR HIV (HUMAN IMMUNODEFICIENCY VIRUS): ICD-10-CM

## 2025-02-21 LAB
ALBUMIN SERPL BCG-MCNC: 4 G/DL (ref 3.5–5)
ALP SERPL-CCNC: 53 U/L (ref 34–104)
ALT SERPL W P-5'-P-CCNC: 12 U/L (ref 7–52)
ANION GAP SERPL CALCULATED.3IONS-SCNC: 9 MMOL/L (ref 4–13)
AST SERPL W P-5'-P-CCNC: 17 U/L (ref 13–39)
B-HCG SERPL-ACNC: 1 MIU/ML (ref 0–5)
BACTERIA UR QL AUTO: ABNORMAL /HPF
BASOPHILS # BLD AUTO: 0.02 THOUSANDS/ΜL (ref 0–0.1)
BASOPHILS NFR BLD AUTO: 0 % (ref 0–1)
BILIRUB SERPL-MCNC: 0.69 MG/DL (ref 0.2–1)
BILIRUB UR QL STRIP: NEGATIVE
BUN SERPL-MCNC: 10 MG/DL (ref 5–25)
CALCIUM SERPL-MCNC: 9.2 MG/DL (ref 8.4–10.2)
CHLORIDE SERPL-SCNC: 104 MMOL/L (ref 96–108)
CHOLEST SERPL-MCNC: 146 MG/DL (ref ?–200)
CLARITY UR: CLEAR
CO2 SERPL-SCNC: 27 MMOL/L (ref 21–32)
COLOR UR: ABNORMAL
CREAT SERPL-MCNC: 0.65 MG/DL (ref 0.6–1.3)
EOSINOPHIL # BLD AUTO: 0.08 THOUSAND/ΜL (ref 0–0.61)
EOSINOPHIL NFR BLD AUTO: 1 % (ref 0–6)
ERYTHROCYTE [DISTWIDTH] IN BLOOD BY AUTOMATED COUNT: 12.7 % (ref 11.6–15.1)
EST. AVERAGE GLUCOSE BLD GHB EST-MCNC: 91 MG/DL
FERRITIN SERPL-MCNC: 12 NG/ML (ref 11–307)
GFR SERPL CREATININE-BSD FRML MDRD: 124 ML/MIN/1.73SQ M
GLUCOSE P FAST SERPL-MCNC: 77 MG/DL (ref 65–99)
GLUCOSE UR STRIP-MCNC: NEGATIVE MG/DL
HBA1C MFR BLD: 4.8 %
HCT VFR BLD AUTO: 34.4 % (ref 34.8–46.1)
HDLC SERPL-MCNC: 40 MG/DL
HGB BLD-MCNC: 10.7 G/DL (ref 11.5–15.4)
HGB UR QL STRIP.AUTO: NEGATIVE
IMM GRANULOCYTES # BLD AUTO: 0.02 THOUSAND/UL (ref 0–0.2)
IMM GRANULOCYTES NFR BLD AUTO: 0 % (ref 0–2)
IRON SATN MFR SERPL: 13 % (ref 15–50)
IRON SERPL-MCNC: 58 UG/DL (ref 50–212)
KETONES UR STRIP-MCNC: NEGATIVE MG/DL
LDLC SERPL CALC-MCNC: 86 MG/DL (ref 0–100)
LEUKOCYTE ESTERASE UR QL STRIP: NEGATIVE
LYMPHOCYTES # BLD AUTO: 1.53 THOUSANDS/ΜL (ref 0.6–4.47)
LYMPHOCYTES NFR BLD AUTO: 26 % (ref 14–44)
MCH RBC QN AUTO: 28.8 PG (ref 26.8–34.3)
MCHC RBC AUTO-ENTMCNC: 31.1 G/DL (ref 31.4–37.4)
MCV RBC AUTO: 93 FL (ref 82–98)
MONOCYTES # BLD AUTO: 0.39 THOUSAND/ΜL (ref 0.17–1.22)
MONOCYTES NFR BLD AUTO: 7 % (ref 4–12)
MUCOUS THREADS UR QL AUTO: ABNORMAL
NEUTROPHILS # BLD AUTO: 3.93 THOUSANDS/ΜL (ref 1.85–7.62)
NEUTS SEG NFR BLD AUTO: 66 % (ref 43–75)
NITRITE UR QL STRIP: NEGATIVE
NON-SQ EPI CELLS URNS QL MICRO: ABNORMAL /HPF
NONHDLC SERPL-MCNC: 106 MG/DL
NRBC BLD AUTO-RTO: 0 /100 WBCS
PH UR STRIP.AUTO: 7 [PH]
PLATELET # BLD AUTO: 340 THOUSANDS/UL (ref 149–390)
PMV BLD AUTO: 11 FL (ref 8.9–12.7)
POTASSIUM SERPL-SCNC: 3.8 MMOL/L (ref 3.5–5.3)
PROT SERPL-MCNC: 7.3 G/DL (ref 6.4–8.4)
PROT UR STRIP-MCNC: ABNORMAL MG/DL
RBC # BLD AUTO: 3.71 MILLION/UL (ref 3.81–5.12)
RBC #/AREA URNS AUTO: ABNORMAL /HPF
SODIUM SERPL-SCNC: 140 MMOL/L (ref 135–147)
SP GR UR STRIP.AUTO: 1.01 (ref 1–1.03)
TIBC SERPL-MCNC: 439.6 UG/DL (ref 250–450)
TRANSFERRIN SERPL-MCNC: 314 MG/DL (ref 203–362)
TRIGL SERPL-MCNC: 101 MG/DL (ref ?–150)
TSH SERPL DL<=0.05 MIU/L-ACNC: 0.74 UIU/ML (ref 0.45–4.5)
UIBC SERPL-MCNC: 382 UG/DL (ref 155–355)
UROBILINOGEN UR STRIP-ACNC: <2 MG/DL
WBC # BLD AUTO: 5.97 THOUSAND/UL (ref 4.31–10.16)
WBC #/AREA URNS AUTO: ABNORMAL /HPF

## 2025-02-21 PROCEDURE — 80061 LIPID PANEL: CPT

## 2025-02-21 PROCEDURE — 83036 HEMOGLOBIN GLYCOSYLATED A1C: CPT

## 2025-02-21 PROCEDURE — 82728 ASSAY OF FERRITIN: CPT

## 2025-02-21 PROCEDURE — 83550 IRON BINDING TEST: CPT

## 2025-02-21 PROCEDURE — 85025 COMPLETE CBC W/AUTO DIFF WBC: CPT

## 2025-02-21 PROCEDURE — 81001 URINALYSIS AUTO W/SCOPE: CPT

## 2025-02-21 PROCEDURE — 84443 ASSAY THYROID STIM HORMONE: CPT

## 2025-02-21 PROCEDURE — 84702 CHORIONIC GONADOTROPIN TEST: CPT

## 2025-02-21 PROCEDURE — 83540 ASSAY OF IRON: CPT

## 2025-02-21 PROCEDURE — 80053 COMPREHEN METABOLIC PANEL: CPT

## 2025-02-21 PROCEDURE — 86803 HEPATITIS C AB TEST: CPT

## 2025-02-21 PROCEDURE — 36415 COLL VENOUS BLD VENIPUNCTURE: CPT

## 2025-02-21 PROCEDURE — 87389 HIV-1 AG W/HIV-1&-2 AB AG IA: CPT

## 2025-02-21 PROCEDURE — 87591 N.GONORRHOEAE DNA AMP PROB: CPT

## 2025-02-21 PROCEDURE — 87491 CHLMYD TRACH DNA AMP PROBE: CPT

## 2025-02-22 LAB
C TRACH DNA SPEC QL NAA+PROBE: NEGATIVE
HCV AB SER QL: NORMAL
HIV 1+2 AB+HIV1 P24 AG SERPL QL IA: NORMAL
N GONORRHOEA DNA SPEC QL NAA+PROBE: NEGATIVE

## 2025-02-24 ENCOUNTER — RESULTS FOLLOW-UP (OUTPATIENT)
Dept: OBGYN CLINIC | Facility: CLINIC | Age: 25
End: 2025-02-24

## 2025-02-24 DIAGNOSIS — D50.9 IRON DEFICIENCY ANEMIA, UNSPECIFIED IRON DEFICIENCY ANEMIA TYPE: Primary | ICD-10-CM

## 2025-02-24 NOTE — TELEPHONE ENCOUNTER
Spoke with patient regarding iron studies.  Ferritin is 12; iron deficiency anemia present.  Will refer to hematology for possible iron infusions.  Patient to be seen in office to discuss options for period control; will schedule today.  Call with further questions.

## 2025-02-25 NOTE — TELEPHONE ENCOUNTER
----- Message from Julienne Beebe MD sent at 2/25/2025  1:00 PM EST -----  Please call the patient regarding her abnormal result.  Blood work shows iron deficiency anemia.  Her GYN PA has already wrote a note regarding this so follow-up according to the note.  Asked the patient if she has any UTI symptoms. ty

## 2025-02-25 NOTE — RESULT ENCOUNTER NOTE
Please call the patient regarding her abnormal result.  Blood work shows iron deficiency anemia.  Her GYN PA has already wrote a note regarding this so follow-up according to the note.  Asked the patient if she has any UTI symptoms. ty

## 2025-03-14 ENCOUNTER — OFFICE VISIT (OUTPATIENT)
Dept: HEMATOLOGY ONCOLOGY | Facility: MEDICAL CENTER | Age: 25
End: 2025-03-14
Payer: COMMERCIAL

## 2025-03-14 ENCOUNTER — TELEPHONE (OUTPATIENT)
Dept: HEMATOLOGY ONCOLOGY | Facility: MEDICAL CENTER | Age: 25
End: 2025-03-14

## 2025-03-14 VITALS
OXYGEN SATURATION: 99 % | WEIGHT: 238 LBS | HEIGHT: 64 IN | TEMPERATURE: 98 F | RESPIRATION RATE: 15 BRPM | DIASTOLIC BLOOD PRESSURE: 75 MMHG | SYSTOLIC BLOOD PRESSURE: 110 MMHG | HEART RATE: 75 BPM | BODY MASS INDEX: 40.63 KG/M2

## 2025-03-14 DIAGNOSIS — D50.0 IRON DEFICIENCY ANEMIA DUE TO CHRONIC BLOOD LOSS: Primary | ICD-10-CM

## 2025-03-14 DIAGNOSIS — D50.9 IRON DEFICIENCY ANEMIA, UNSPECIFIED IRON DEFICIENCY ANEMIA TYPE: ICD-10-CM

## 2025-03-14 PROCEDURE — 99203 OFFICE O/P NEW LOW 30 MIN: CPT | Performed by: PHYSICIAN ASSISTANT

## 2025-03-14 RX ORDER — SODIUM CHLORIDE 9 MG/ML
20 INJECTION, SOLUTION INTRAVENOUS ONCE
OUTPATIENT
Start: 2025-03-27

## 2025-03-14 NOTE — PROGRESS NOTES
Name: Abby Hercules      : 2000      MRN: [id number]  Encounter Provider: Cari Akbar PA-C  Encounter Date: 3/14/2025   Encounter department: [facility]  :  Assessment & Plan  Iron deficiency anemia, unspecified iron deficiency anemia type  Patient is a 24-year-old who presents for evaluation of iron deficiency anemia.    She had lab work drawn on 2025.  WBC 5.97, hemoglobin 10.7, hematocrit 34.4, platelets 340.  Ferritin 12, iron saturation 13%.  CMP within normal limits.    She is aware that iron deficiency is either related to bleeding or decreased absorption.  She has no obvious reason to have decreased absorption. Her past medical history is significant for PCOS.  She admits to menorrhagia for which she follows with gynecology.  She has no evidence of GI blood loss.  She has never completed GI studies.    She will be treated with Venofer 200 mg weekly x 6.  She has a follow-up visit scheduled with gynecology to discuss management of the menorrhagia.    She will return to clinic with repeat lab work in 4 months time.    Orders:    Ambulatory Referral to Hematology / Oncology        History of Present Illness   Chief Complaint   Patient presents with    Consult   Patient is a 24-year-old.  She presents for evaluation of iron deficiency anemia.    Her past medical history is significant for PCOS which was diagnosed at age 21.    She does follow with gynecology.  She says that her periods occur monthly.  Periods last for around 5 days, are heavy for the first 2 days.  She says that she changes a pad every 45 minutes on her heavy days.  She has a follow-up with gynecology next week to discuss the menorrhagia.    She denies any evidence of bleeding otherwise.  No evidence of GI blood loss.  No hematuria.    She had lab work drawn on 2025.  WBC 5.97, hemoglobin 10.7, hematocrit 34.4, platelets 340.  Ferritin 12, iron saturation 13%.  CMP within normal  limits.    Patient has never taken oral iron but is concerned that she will have difficulty tolerating due to GI effects.  She admits to fatigue.  She denies nausea, bowel pain, shortness of breath.    Review of Systems   Constitutional:  Positive for fatigue. Negative for appetite change, fever and unexpected weight change.   HENT:  Negative for nosebleeds.    Respiratory:  Negative for cough, choking and shortness of breath.         Negative hemoptysis.   Cardiovascular:  Negative for chest pain, palpitations and leg swelling.   Gastrointestinal: Negative.  Negative for abdominal distention, abdominal pain, anal bleeding, blood in stool, constipation, diarrhea, nausea and vomiting.   Endocrine: Negative.  Negative for cold intolerance.   Genitourinary: Negative.  Negative for hematuria, menstrual problem, vaginal bleeding, vaginal discharge and vaginal pain.   Musculoskeletal: Negative.  Negative for arthralgias, myalgias, neck pain and neck stiffness.   Skin: Negative.  Negative for color change, pallor and rash.   Allergic/Immunologic: Negative.  Negative for immunocompromised state.   Neurological: Negative.  Negative for weakness and headaches.   Hematological:  Negative for adenopathy. Does not bruise/bleed easily.   All other systems reviewed and are negative.      Objective   Vitals:    03/14/25 0827   BP: 110/75   Pulse: 75   Resp: 15   Temp: 98 °F (36.7 °C)   SpO2: 99%     Physical Exam  Constitutional:       General: She is not in acute distress.     Appearance: She is well-developed. She is obese.   HENT:      Head: Normocephalic and atraumatic.   Eyes:      General: No scleral icterus.     Conjunctiva/sclera: Conjunctivae normal.   Cardiovascular:      Rate and Rhythm: Normal rate and regular rhythm.   Pulmonary:      Effort: Pulmonary effort is normal. No respiratory distress.      Breath sounds: Normal breath sounds.   Abdominal:      General: There is no distension.      Palpations: Abdomen is soft.       Tenderness: There is no abdominal tenderness.   Musculoskeletal:      Right lower leg: No edema.      Left lower leg: No edema.   Skin:     General: Skin is warm.      Coloration: Skin is not pale.      Findings: No rash.   Neurological:      Mental Status: She is alert and oriented to person, place, and time.   Psychiatric:         Mood and Affect: Mood normal.         Thought Content: Thought content normal.         Judgment: Judgment normal.     Labs: I have reviewed the following labs:  Results for orders placed or performed in visit on 02/21/25   Chlamydia/GC amplified DNA by PCR    Specimen: Urine, Other   Result Value Ref Range    N gonorrhoeae, DNA Probe Negative Negative    Chlamydia trachomatis, DNA Probe Negative Negative   Lipid panel   Result Value Ref Range    Cholesterol 146 See Comment mg/dL    Triglycerides 101 See Comment mg/dL    HDL, Direct 40 (L) >=50 mg/dL    LDL Calculated 86 0 - 100 mg/dL    Non-HDL-Chol (CHOL-HDL) 106 mg/dl   HIV 1/2 AB/AG w Reflex [facility] for 2 yr old and above   Result Value Ref Range    HIV AB/AG HT Interp Non-Reactive Non-Reactive   Hepatitis C antibody   Result Value Ref Range    Hepatitis C Ab Non-reactive Non-Reactive   Hemoglobin A1C   Result Value Ref Range    Hemoglobin A1C 4.8 Normal 4.0-5.6%; PreDiabetic 5.7-6.4%; Diabetic >=6.5%; Glycemic control for adults with diabetes <7.0% %    EAG 91 mg/dl   CBC and differential   Result Value Ref Range    WBC 5.97 4.31 - 10.16 Thousand/uL    RBC 3.71 (L) 3.81 - 5.12 Million/uL    Hemoglobin 10.7 (L) 11.5 - 15.4 g/dL    Hematocrit 34.4 (L) 34.8 - 46.1 %    MCV 93 82 - 98 fL    MCH 28.8 26.8 - 34.3 pg    MCHC 31.1 (L) 31.4 - 37.4 g/dL    RDW 12.7 11.6 - 15.1 %    MPV 11.0 8.9 - 12.7 fL    Platelets 340 149 - 390 Thousands/uL    nRBC 0 /100 WBCs    Segmented % 66 43 - 75 %    Immature Grans % 0 0 - 2 %    Lymphocytes % 26 14 - 44 %    Monocytes % 7 4 - 12 %    Eosinophils Relative 1 0 - 6 %    Basophils Relative 0 0 - 1 %     Absolute Neutrophils 3.93 1.85 - 7.62 Thousands/µL    Absolute Immature Grans 0.02 0.00 - 0.20 Thousand/uL    Absolute Lymphocytes 1.53 0.60 - 4.47 Thousands/µL    Absolute Monocytes 0.39 0.17 - 1.22 Thousand/µL    Eosinophils Absolute 0.08 0.00 - 0.61 Thousand/µL    Basophils Absolute 0.02 0.00 - 0.10 Thousands/µL   Comprehensive metabolic panel   Result Value Ref Range    Sodium 140 135 - 147 mmol/L    Potassium 3.8 3.5 - 5.3 mmol/L    Chloride 104 96 - 108 mmol/L    CO2 27 21 - 32 mmol/L    ANION GAP 9 4 - 13 mmol/L    BUN 10 5 - 25 mg/dL    Creatinine 0.65 0.60 - 1.30 mg/dL    Glucose, Fasting 77 65 - 99 mg/dL    Calcium 9.2 8.4 - 10.2 mg/dL    AST 17 13 - 39 U/L    ALT 12 7 - 52 U/L    Alkaline Phosphatase 53 34 - 104 U/L    Total Protein 7.3 6.4 - 8.4 g/dL    Albumin 4.0 3.5 - 5.0 g/dL    Total Bilirubin 0.69 0.20 - 1.00 mg/dL    eGFR 124 ml/min/1.73sq m   TSH, 3rd generation with Free T4 reflex   Result Value Ref Range    TSH 3RD GENERATON 0.741 0.450 - 4.500 uIU/mL   hCG, quantitative   Result Value Ref Range    HCG, Quant 1.0 0 - 5 mIU/mL   UA w Reflex to Microscopic w Reflex to Culture    Specimen: Urine, Clean Catch   Result Value Ref Range    Color, UA Light Yellow     Clarity, UA Clear     Specific Gravity, UA 1.012 1.003 - 1.030    pH, UA 7.0 4.5, 5.0, 5.5, 6.0, 6.5, 7.0, 7.5, 8.0    Leukocytes, UA Negative Negative    Nitrite, UA Negative Negative    Protein, UA Trace (A) Negative mg/dl    Glucose, UA Negative Negative mg/dl    Ketones, UA Negative Negative mg/dl    Urobilinogen, UA <2.0 <2.0 mg/dl mg/dl    Bilirubin, UA Negative Negative    Occult Blood, UA Negative Negative   TIBC Panel (incl. Iron, TIBC, % Iron Saturation)   Result Value Ref Range    Iron Saturation 13 (L) 15 - 50 %    TIBC 439.6 250 - 450 ug/dL    Iron 58 50 - 212 ug/dL    Transferrin 314 203 - 362 mg/dL    UIBC 382 (H) 155 - 355 ug/dL   Result Value Ref Range    Ferritin 12 11 - 307 ng/mL   Urine Microscopic   Result Value Ref  Range    RBC, UA 1-2 None Seen, 1-2 /hpf    WBC, UA None Seen None Seen, 1-2 /hpf    Epithelial Cells Occasional None Seen, Occasional /hpf    Bacteria, UA Moderate (A) None Seen, Occasional /hpf    MUCUS THREADS Occasional (A) None Seen

## 2025-03-14 NOTE — ASSESSMENT & PLAN NOTE
Patient is a 24-year-old who presents for evaluation of iron deficiency anemia.    She had lab work drawn on 2/21/2025.  WBC 5.97, hemoglobin 10.7, hematocrit 34.4, platelets 340.  Ferritin 12, iron saturation 13%.  CMP within normal limits.    She is aware that iron deficiency is either related to bleeding or decreased absorption.  She has no obvious reason to have decreased absorption. Her past medical history is significant for PCOS.  She admits to menorrhagia for which she follows with gynecology.  She has no evidence of GI blood loss.  She has never completed GI studies.    She will be treated with Venofer 200 mg weekly x 6.  She has a follow-up visit scheduled with gynecology to discuss management of the menorrhagia.    She will return to clinic with repeat lab work in 4 months time.    Orders:    Ambulatory Referral to Hematology / Oncology

## 2025-03-20 ENCOUNTER — OFFICE VISIT (OUTPATIENT)
Dept: OBGYN CLINIC | Facility: CLINIC | Age: 25
End: 2025-03-20
Payer: COMMERCIAL

## 2025-03-20 VITALS
WEIGHT: 237.8 LBS | BODY MASS INDEX: 40.6 KG/M2 | SYSTOLIC BLOOD PRESSURE: 116 MMHG | HEIGHT: 64 IN | DIASTOLIC BLOOD PRESSURE: 64 MMHG

## 2025-03-20 DIAGNOSIS — E28.2 PCOS (POLYCYSTIC OVARIAN SYNDROME): ICD-10-CM

## 2025-03-20 DIAGNOSIS — N92.0 MENORRHAGIA WITH REGULAR CYCLE: Primary | ICD-10-CM

## 2025-03-20 PROCEDURE — 99213 OFFICE O/P EST LOW 20 MIN: CPT | Performed by: PHYSICIAN ASSISTANT

## 2025-03-20 RX ORDER — NORELGESTROMIN AND ETHINYL ESTRADIOL 35; 150 UG/MG; UG/MG
1 PATCH TRANSDERMAL WEEKLY
Qty: 3 PATCH | Refills: 3 | Status: SHIPPED | OUTPATIENT
Start: 2025-03-20

## 2025-03-20 NOTE — PROGRESS NOTES
"Name: Abby Hercules      : 2000      MRN: [id number]  Encounter Provider: Gloria Lee PA-C  Encounter Date: 3/20/2025   Encounter department: [facility] [place]  :  Assessment & Plan  Menorrhagia with regular cycle    Orders:    norelgestromin-ethinyl estradiol (ORTHO EVRA) 150-35 MCG/24HR; Place 1 patch on the skin over 7 days once a week    PCOS (polycystic ovarian syndrome)    Orders:    norelgestromin-ethinyl estradiol (ORTHO EVRA) 150-35 MCG/24HR; Place 1 patch on the skin over 7 days once a week    Discussed birth control patch with patient.  Will hopefully improve bleeding; can do continuous patch if periods remain heavy.  Rx x 3 refills sent to pharmacy.  Place first patch first day of next period; instructions for use given.  F/u for iron infusions as planned.  Stressed consistent condom use for STD prevention.  F/u in 3 mos for recheck.  Call with problems in the interim.    History of Present Illness   Patient is here to discuss birth control to help with heavy periods.  No changes since her visit on .  Has a history of PCOS.  Periods are currently regular every 28 days, and bleeding lasts for 5 days.  Flow has been extremely heavy.  Recent iron studies showed anemia with a ferritin of 12.  Saw hematology and has iron infusions scheduled.  Would like to start birth control patch as she did well on it previously.      Abby Hercules is a 24 y.o. female who presents to discuss birth control.  History obtained from: patient    Review of Systems   Genitourinary:  Positive for menstrual problem (Menorrhagia).          Objective   /64 (BP Location: Left arm, Patient Position: Sitting, Cuff Size: Adult)   Ht 5' 4\" (1.626 m)   Wt 108 kg (237 lb 12.8 oz)   LMP 2025 (Exact Date)   BMI 40.82 kg/m²      Physical Exam  Vitals reviewed.   Constitutional:       Appearance: Normal appearance. She is well-developed. She is obese.   Neurological:      " Mental Status: She is alert and oriented to person, place, and time.   Psychiatric:         Mood and Affect: Mood normal.         Behavior: Behavior normal. Behavior is cooperative.         Thought Content: Thought content normal.         Judgment: Judgment normal.

## 2025-03-20 NOTE — ASSESSMENT & PLAN NOTE
Orders:    norelgestromin-ethinyl estradiol (ORTHO EVRA) 150-35 MCG/24HR; Place 1 patch on the skin over 7 days once a week

## 2025-03-20 NOTE — PATIENT INSTRUCTIONS
Rx for Ortho Evra patch x 3 refills sent to pharmacy.  Place first patch first day of next period.  Instructions for use given.    Follow up for iron infusions as planned.    Practice consistent condom use for STD prevention.    Call with problems.

## 2025-03-25 ENCOUNTER — TELEPHONE (OUTPATIENT)
Dept: INFUSION CENTER | Facility: CLINIC | Age: 25
End: 2025-03-25

## 2025-03-25 NOTE — TELEPHONE ENCOUNTER
New patient phone call completed. Patient appointment date and time and location confirmed. Visitor policy reviewed. All questions answered to patient's satisfaction.

## 2025-03-27 ENCOUNTER — HOSPITAL ENCOUNTER (OUTPATIENT)
Dept: INFUSION CENTER | Facility: CLINIC | Age: 25
Discharge: HOME | End: 2025-03-27
Payer: COMMERCIAL

## 2025-03-27 VITALS
DIASTOLIC BLOOD PRESSURE: 65 MMHG | SYSTOLIC BLOOD PRESSURE: 113 MMHG | OXYGEN SATURATION: 99 % | HEART RATE: 78 BPM | RESPIRATION RATE: 16 BRPM | TEMPERATURE: 97.8 F

## 2025-03-27 DIAGNOSIS — D50.0 IRON DEFICIENCY ANEMIA DUE TO CHRONIC BLOOD LOSS: Primary | ICD-10-CM

## 2025-03-27 PROCEDURE — 96365 THER/PROPH/DIAG IV INF INIT: CPT

## 2025-03-27 RX ORDER — SODIUM CHLORIDE 9 MG/ML
20 INJECTION, SOLUTION INTRAVENOUS ONCE
Status: CANCELLED | OUTPATIENT
Start: 2025-04-04

## 2025-03-27 RX ORDER — SODIUM CHLORIDE 9 MG/ML
20 INJECTION, SOLUTION INTRAVENOUS ONCE
Status: COMPLETED | OUTPATIENT
Start: 2025-03-27 | End: 2025-03-27

## 2025-03-27 RX ADMIN — IRON SUCROSE 200 MG: 20 INJECTION, SOLUTION INTRAVENOUS at 15:28

## 2025-03-27 RX ADMIN — SODIUM CHLORIDE 20 ML/HR: 9 INJECTION, SOLUTION INTRAVENOUS at 15:28

## 2025-03-27 NOTE — PROGRESS NOTES
Pt here for venofer, tolerated well with no complaints at this time. Next appt 4/4 at 1130 at [facility]. Declined AVS.

## 2025-03-28 ENCOUNTER — OFFICE VISIT (OUTPATIENT)
Age: 25
End: 2025-03-28
Payer: COMMERCIAL

## 2025-03-28 VITALS
BODY MASS INDEX: 38.99 KG/M2 | HEART RATE: 85 BPM | DIASTOLIC BLOOD PRESSURE: 70 MMHG | TEMPERATURE: 97 F | HEIGHT: 65 IN | SYSTOLIC BLOOD PRESSURE: 122 MMHG | RESPIRATION RATE: 16 BRPM | WEIGHT: 234 LBS

## 2025-03-28 DIAGNOSIS — E66.01 MORBID OBESITY WITH BMI OF 40.0-44.9, ADULT (HCC): Primary | ICD-10-CM

## 2025-03-28 DIAGNOSIS — E28.2 PCOS (POLYCYSTIC OVARIAN SYNDROME): ICD-10-CM

## 2025-03-28 DIAGNOSIS — E78.5 DYSLIPIDEMIA (HIGH LDL; LOW HDL): ICD-10-CM

## 2025-03-28 PROCEDURE — 99204 OFFICE O/P NEW MOD 45 MIN: CPT | Performed by: PHYSICIAN ASSISTANT

## 2025-03-28 RX ORDER — TIRZEPATIDE 2.5 MG/.5ML
2.5 INJECTION, SOLUTION SUBCUTANEOUS WEEKLY
Qty: 2 ML | Refills: 0 | Status: SHIPPED | OUTPATIENT
Start: 2025-03-28 | End: 2025-04-25

## 2025-03-28 RX ORDER — DIPHENOXYLATE HYDROCHLORIDE AND ATROPINE SULFATE 2.5; .025 MG/1; MG/1
1 TABLET ORAL DAILY
COMMUNITY

## 2025-03-28 NOTE — PROGRESS NOTES
Assessment/Plan:  Abby was seen today for consult.    Diagnoses and all orders for this visit:    BMI 39.0-39.9,adult    Morbid obesity with BMI of 40.0-44.9, adult (HCC)  -     Ambulatory referral to Weight Management    PCOS (polycystic ovarian syndrome)       No problem-specific Assessment & Plan notes found for this encounter.     - Discussed options of HealthyCORE-Intensive Lifestyle Intervention Program, Very Low Calorie Diet-VLCD, and Conservative Program and the role of weight loss medications.  - Explained the importance of making lifestyle changes first before starting anti-obesity medications.  - Patient should demonstrate lifestyle changes first before anti-obesity medication initiated.   - Patient is interested in pursuing Conservative Program  - Initial weight loss goal of 5-10% weight loss for improved health as studies have shown this is where we see the greatest impact on improving health and decreasing risk of obesity related conditions.  - Weight loss can improve patient's co-morbid conditions and/or prevent weight-related complications.  - Stop Bang 2/8  - Labs reviewed: As below.      General Recommendations:  Nutrition:  Eat breakfast daily.  Do not skip meals.     Food log (ie.) www.[place].com, sparkpeople.com, loseit.com, calorieking.com, etc.    Practice mindful eating.  Be sure to set aside time to eat, eat slowly, and savor your food.    Hydration:    At least 64oz of water daily.  No sugar sweetened beverages.  No juice (eat the fruit instead).    Exercise:  Studies have shown that the ideal exercise goal is somewhere between 150 to 300 minutes of moderate intensity exercise a week.  Start with exercising 10 minutes every other day and gradually increase physical activity with a goal of at least 150 minutes of moderate intensity exercise a week, divided over at least 3 days a week.  An example of this would be exercising 30 minutes a day, 5 days a week.  Resistance training can  increase muscle mass and increase our resting metabolic rate.   FULL BODY resistance training is recommended 2-3 times a week.  Do not do this on consecutive days to allow for muscle recovery.    Aim for a bare minimum 5000 steps, even on days you do not exercise.    Monitoring:   Weigh yourself daily.  If this causes undue stress, then just weigh yourself once a week.  Weigh yourself the same time of the day with the same amount of clothing on.  Preferably this should be done after waking up, before you eat, and with no clothing or minimal clothing on.    Specific Goals:  Food log (ie.) www.[place].[email or web address],sparkpeople.com,loseit.com,BuddyBounce.com,etc.   Goal protein intake of 60-80 grams per day  Gradually increase physical activity to a goal of 5 days per week for 30 minutes of MODERATE intensity PLUS 2 days per week of FULL BODY resistance training    Calorie goal:  referral to dietitian (Provided with meal plan to follow).    Return visit:  2 months  1)  RX Zepbound 2.5mg x 4 weeks and then increase to 5mg once weekly. Emphasized the importance of adherence to the medication schedule and lifestyle modifications, including diet and exercise. Provided patient with written materials on Zepbound usage, dietary plans, and exercise recommendations. Discussed the importance of regular monitoring and follow up to ensure the safety and effectiveness of the treatment.  Education provided on side effects, how to monitor and when to see medical attention.  Patient received training on self-administration of the injection. Goal of treatment is to attain a weight loss of approximately 5-10% TBW within next 3-6 months.  Goal is to achieve weight loss to improve overall health and reduce risks associated with obesity and weight related comorbidities. She has tried more than 6 months of lifestyle modifications to include diet and activity changes and has had insignificant weight loss of less than 1 lb per week.  Patient  denies personal and family history of MCT and MEN2 tumors. Patient denies personal history of pancreatitis.  Side effects discussed but not limited to diarrhea, bloating, constipation, nausea, GI upset, heartburn, fatigue.  Titration and medication administration discussed. Medication agreement signed .        I have sent Zebound to your pharmacy. The prior authorization process will been done through our prior authorization team and can take up to 3-4 weeks to process through the insurance.     - Start Zepbound 2.5 mg subcutaneously weekly. After you have taken the second pen, please give me an update, as we will likely increase the dose the next month if you are tolerating it well.    - Side effects of Zepbound include nausea, vomiting, diarrhea, or constipation. Keep an eye on your heart rate while on Zepbound. If you resting heart rate is greater than 100 beats per minutes, please notify me. If you develop severe abdominal pain, stop Zepbound and go to the emergency room, as that could be a sign of pancreatitis.     - Please notify me if you have surgery, upper endoscopy, or colonoscopy scheduled, as we typically hold Zepbound for one week prior to the procedure.     - Zepbound can reduce the effectiveness of oral hormonal birth control (birth control pills). Recommend a barrier backup method such as condoms to prevent pregnancy.         2) Nutrition RX:  - start tracking intake  - focus on protein- goal is 30 grams per meal; 90 grams per day  - focus on increasing fiber first by increasing vegetable servings per day  - do not skip breakfast and goal water intake 64 oz daily    Physical Activity RX:  - Goal is 150-200 mins of activity weekly.  Try to include at least 2 strength training sessions; increasing lean body mass will really help you to lose weight and maintain weight loss.      AOM considerations:  Patient denies personal and family history of  pancreatitis, thyroid cancer, MEN-2 tumors.  Denies any hx  of glaucoma, seizures, kidney stones, gallstones.  Denies Hx of CAD, PAD, palpitations, arrhythmia.   Denies uncontrolled anxiety or depression, suicidal behavior or thinking , insomnia or sleep disturbance.     - patient was on phentermine + topiramate and did not have any side effects from the medication, she lost 25 lbs last time she was on about 2 years ago    Total time spent reviewing chart, interviewing patient, examining patient, discussing plan, answering all questions, and documentin min.       ______________________________________________________________________        Subjective:   Chief Complaint   Patient presents with    Consult     MWM- Consult; GW-165-175; Waist-40in; Stop Bang-       HPI: Abby Hercules  is a 24 y.o. female with history of PCOS, dyslipidemia and excess weight, here to pursue weight loss management.  Previous notes and records have been reviewed.    Weight History:  Started struggling with weight when she was diagnosed with PCOS in . Patient reports she was around 180-200 lbs right before that and since then weight has started creep up.     She has been working on cutting out junk food and incorporating more protein.     Patient was diagnosed with iron def anemia and started on iron infusions in .     Physical Activity:  Goes to the gym 2x per week- treadmill and weights circuit at [employer]   Also does hot yoga     HPI  Wt Readings from Last 20 Encounters:   25 106 kg (234 lb 6.4 oz)   25 108 kg (237 lb 12.8 oz)   25 108 kg (238 lb)   25 108 kg (237 lb 6.4 oz)   01/15/25 106 kg (234 lb 9.6 oz)   24 108 kg (237 lb 12.8 oz)   22 102 kg (225 lb)                     Excess Weight:  Severity: severe  Onset:   dx of PCOS    Food logging:  B: coffee, cereal, avocado toast, just started with protein shakes, yogurt   S:  L:grilled chicken wrap, chicken with rice or salad  S:  D: recently baked ziti; chicken and mashed  "potatos and always has a vegetable   S: none       Dining out:rare  Hydration: drinks >60 oz water throughout the day  Alcohol: occasionally   Smoking: vape occasional   Exercise: gym, yoga   Weight Monitoring:  Sleep:  STOP-BANG Score:  Occupation:  at [employer]; always around food       Past Medical History:   Diagnosis Date    Allergic     PCOS (polycystic ovarian syndrome)      Patient denies personal and family history of  pancreatitis, thyroid cancer, MEN-2 tumors.  Denies any hx of glaucoma, seizures, kidney stones, gallstones.  Denies Hx of CAD, PAD, palpitations, arrhythmia.   Denies uncontrolled anxiety or depression, suicidal behavior or thinking , insomnia or sleep disturbance.   History reviewed. No pertinent surgical history.  The following portions of the patient's history were reviewed and updated as appropriate: allergies, current medications, past family history, past medical history, past social history, past surgical history, and problem list.    Review Of Systems:  Review of Systems   Constitutional:  Positive for fatigue.   HENT: Negative.     Eyes: Negative.    Gastrointestinal:  Negative for constipation, diarrhea and nausea.   Genitourinary: Negative.    Musculoskeletal: Negative.    Skin: Negative.    Allergic/Immunologic: Negative.    Neurological: Negative.  Negative for headaches.   Hematological: Negative.    Psychiatric/Behavioral: Negative.         Objective:  /70 (BP Location: Left arm, Patient Position: Sitting)   Pulse 85   Temp (!) 97 °F (36.1 °C) (Tympanic)   Resp 16   Ht 5' 5\" (1.651 m)   Wt 106 kg (234 lb 6.4 oz)   LMP 02/22/2025 (Exact Date)   BMI 39.01 kg/m²   Physical Exam  Vitals reviewed.   Constitutional:       Appearance: She is obese.   HENT:      Head: Normocephalic.      Mouth/Throat:      Mouth: Mucous membranes are moist.   Eyes:      Pupils: Pupils are equal, round, and reactive to light.   Cardiovascular:      Rate and Rhythm: Normal rate and " regular rhythm.   Pulmonary:      Effort: Pulmonary effort is normal.      Breath sounds: Normal breath sounds.   Abdominal:      General: Bowel sounds are normal.      Palpations: Abdomen is soft.   Musculoskeletal:         General: Normal range of motion.      Cervical back: Normal range of motion.   Skin:     General: Skin is warm and dry.   Neurological:      General: No focal deficit present.      Mental Status: She is alert.   Psychiatric:         Mood and Affect: Mood normal.         Thought Content: Thought content normal.         Judgment: Judgment normal.         Labs and Imaging  Recent labs and imaging have been personally reviewed.  Lab Results   Component Value Date    WBC 5.97 02/21/2025    HGB 10.7 (L) 02/21/2025    HCT 34.4 (L) 02/21/2025    MCV 93 02/21/2025     02/21/2025     Lab Results   Component Value Date    SODIUM 140 02/21/2025    K 3.8 02/21/2025     02/21/2025    CO2 27 02/21/2025    AGAP 9 02/21/2025    BUN 10 02/21/2025    CREATININE 0.65 02/21/2025    GLUC 78 12/17/2024    GLUF 77 02/21/2025    CALCIUM 9.2 02/21/2025    AST 17 02/21/2025    ALT 12 02/21/2025    ALKPHOS 53 02/21/2025    TP 7.3 02/21/2025    TBILI 0.69 02/21/2025    EGFR 124 02/21/2025     Lab Results   Component Value Date    HGBA1C 4.8 02/21/2025     Lab Results   Component Value Date    UZF0KQTBCDOA 0.741 02/21/2025     Lab Results   Component Value Date    CHOLESTEROL 146 02/21/2025     Lab Results   Component Value Date    HDL 40 (L) 02/21/2025     Lab Results   Component Value Date    TRIG 101 02/21/2025     Lab Results   Component Value Date    LDLCALC 86 02/21/2025

## 2025-04-01 ENCOUNTER — TELEPHONE (OUTPATIENT)
Age: 25
End: 2025-04-01

## 2025-04-01 NOTE — TELEPHONE ENCOUNTER
PA for Zepbound SUBMITTED to [employer]    via    [x]CMM-KEY: [id number]  []Surescripts-Case ID #    []Availity-Auth ID #  NDC #    []Faxed to plan   []Other website    []Phone call Case ID #      []PA sent as URGENT    All office notes, labs and other pertaining documents and studies sent. Clinical questions answered. Awaiting determination from insurance company.     Turnaround time for your insurance to make a decision on your Prior Authorization can take 7-21 business days.

## 2025-04-02 NOTE — TELEPHONE ENCOUNTER
PA for Zepbound  APPROVED     Date(s) approved Authorization Expiration Date: October 31, 2025.     Case #    Patient advised by          []Evisorshart Message  []Phone call   [x]LMOM  []L/M to call office as no active Communication consent on file  []Unable to leave detailed message as VM not approved on Communication consent       Pharmacy advised by    []Fax  [x]Phone call  []Secure Chat    Specialty Pharmacy    []     Approval letter scanned into Media No -On covermymeds

## 2025-04-04 ENCOUNTER — HOSPITAL ENCOUNTER (OUTPATIENT)
Dept: INFUSION CENTER | Facility: CLINIC | Age: 25
End: 2025-04-04
Payer: COMMERCIAL

## 2025-04-04 VITALS
TEMPERATURE: 97.1 F | DIASTOLIC BLOOD PRESSURE: 60 MMHG | RESPIRATION RATE: 16 BRPM | OXYGEN SATURATION: 99 % | HEART RATE: 66 BPM | SYSTOLIC BLOOD PRESSURE: 92 MMHG

## 2025-04-04 DIAGNOSIS — D50.0 IRON DEFICIENCY ANEMIA DUE TO CHRONIC BLOOD LOSS: Primary | ICD-10-CM

## 2025-04-04 PROCEDURE — 96365 THER/PROPH/DIAG IV INF INIT: CPT

## 2025-04-04 RX ORDER — SODIUM CHLORIDE 9 MG/ML
20 INJECTION, SOLUTION INTRAVENOUS ONCE
Status: CANCELLED | OUTPATIENT
Start: 2025-04-18

## 2025-04-04 RX ORDER — SODIUM CHLORIDE 9 MG/ML
20 INJECTION, SOLUTION INTRAVENOUS ONCE
Status: COMPLETED | OUTPATIENT
Start: 2025-04-04 | End: 2025-04-04

## 2025-04-04 RX ADMIN — SODIUM CHLORIDE 20 ML/HR: 0.9 INJECTION, SOLUTION INTRAVENOUS at 11:42

## 2025-04-04 RX ADMIN — IRON SUCROSE 200 MG: 20 INJECTION, SOLUTION INTRAVENOUS at 11:44

## 2025-04-04 NOTE — PROGRESS NOTES
Patient tolerated treatment well with no adverse reactions. Declined AVS. Confirmed next appointment at the [facility] for 04/18/2025 @ 1100 (patient will not be in town next week so her next appointment is in 2 weeks). Patient ambulatory at discharge.

## 2025-04-04 NOTE — PROGRESS NOTES
Patient presents to the [facility]fusion [facility] for the treatment of Venofer. She offers no concerns at this time. Vital signs stable. PIV placed in her Right AC with good blood return. Patient is resting comfortably in the chair, call bell within reach.

## 2025-04-11 ENCOUNTER — TELEPHONE (OUTPATIENT)
Age: 25
End: 2025-04-11

## 2025-04-11 NOTE — TELEPHONE ENCOUNTER
LVM to reschedule appt on 6/10 with Jada. Appt with RD can me kept in office or change to virtual. Left office info to call back [phone]

## 2025-04-18 ENCOUNTER — HOSPITAL ENCOUNTER (OUTPATIENT)
Dept: INFUSION CENTER | Facility: CLINIC | Age: 25
End: 2025-04-18
Attending: INTERNAL MEDICINE
Payer: COMMERCIAL

## 2025-04-18 VITALS
HEART RATE: 76 BPM | SYSTOLIC BLOOD PRESSURE: 116 MMHG | RESPIRATION RATE: 18 BRPM | DIASTOLIC BLOOD PRESSURE: 81 MMHG | TEMPERATURE: 97.8 F | OXYGEN SATURATION: 98 %

## 2025-04-18 DIAGNOSIS — D50.0 IRON DEFICIENCY ANEMIA DUE TO CHRONIC BLOOD LOSS: Primary | ICD-10-CM

## 2025-04-18 PROCEDURE — 96365 THER/PROPH/DIAG IV INF INIT: CPT

## 2025-04-18 RX ORDER — SODIUM CHLORIDE 9 MG/ML
20 INJECTION, SOLUTION INTRAVENOUS ONCE
Status: COMPLETED | OUTPATIENT
Start: 2025-04-18 | End: 2025-04-18

## 2025-04-18 RX ORDER — SODIUM CHLORIDE 9 MG/ML
20 INJECTION, SOLUTION INTRAVENOUS ONCE
Status: CANCELLED | OUTPATIENT
Start: 2025-04-25

## 2025-04-18 RX ADMIN — IRON SUCROSE 200 MG: 20 INJECTION, SOLUTION INTRAVENOUS at 11:08

## 2025-04-18 RX ADMIN — SODIUM CHLORIDE 20 ML/HR: 0.9 INJECTION, SOLUTION INTRAVENOUS at 11:06

## 2025-04-24 DIAGNOSIS — E28.2 PCOS (POLYCYSTIC OVARIAN SYNDROME): ICD-10-CM

## 2025-04-24 DIAGNOSIS — E78.5 DYSLIPIDEMIA (HIGH LDL; LOW HDL): ICD-10-CM

## 2025-04-24 DIAGNOSIS — E66.01 MORBID OBESITY WITH BMI OF 40.0-44.9, ADULT (HCC): ICD-10-CM

## 2025-04-25 ENCOUNTER — HOSPITAL ENCOUNTER (OUTPATIENT)
Dept: INFUSION CENTER | Facility: CLINIC | Age: 25
End: 2025-04-25
Payer: COMMERCIAL

## 2025-04-25 VITALS
DIASTOLIC BLOOD PRESSURE: 76 MMHG | OXYGEN SATURATION: 97 % | HEART RATE: 80 BPM | TEMPERATURE: 97.5 F | SYSTOLIC BLOOD PRESSURE: 111 MMHG

## 2025-04-25 DIAGNOSIS — E66.01 MORBID OBESITY WITH BMI OF 40.0-44.9, ADULT (HCC): Primary | ICD-10-CM

## 2025-04-25 DIAGNOSIS — E78.5 DYSLIPIDEMIA (HIGH LDL; LOW HDL): ICD-10-CM

## 2025-04-25 DIAGNOSIS — E28.2 PCOS (POLYCYSTIC OVARIAN SYNDROME): ICD-10-CM

## 2025-04-25 DIAGNOSIS — D50.0 IRON DEFICIENCY ANEMIA DUE TO CHRONIC BLOOD LOSS: Primary | ICD-10-CM

## 2025-04-25 PROCEDURE — 96365 THER/PROPH/DIAG IV INF INIT: CPT

## 2025-04-25 RX ORDER — SODIUM CHLORIDE 9 MG/ML
20 INJECTION, SOLUTION INTRAVENOUS ONCE
Status: CANCELLED | OUTPATIENT
Start: 2025-05-02

## 2025-04-25 RX ORDER — SODIUM CHLORIDE 9 MG/ML
20 INJECTION, SOLUTION INTRAVENOUS ONCE
Status: COMPLETED | OUTPATIENT
Start: 2025-04-25 | End: 2025-04-25

## 2025-04-25 RX ORDER — TIRZEPATIDE 5 MG/.5ML
5 INJECTION, SOLUTION SUBCUTANEOUS WEEKLY
Qty: 2 ML | Refills: 2 | Status: SHIPPED | OUTPATIENT
Start: 2025-04-25 | End: 2025-07-18

## 2025-04-25 RX ORDER — TIRZEPATIDE 2.5 MG/.5ML
2.5 INJECTION, SOLUTION SUBCUTANEOUS WEEKLY
Qty: 2 ML | Refills: 0 | OUTPATIENT
Start: 2025-04-25 | End: 2025-05-23

## 2025-04-25 RX ADMIN — IRON SUCROSE 200 MG: 20 INJECTION, SOLUTION INTRAVENOUS at 11:14

## 2025-04-25 RX ADMIN — SODIUM CHLORIDE 20 ML/HR: 0.9 INJECTION, SOLUTION INTRAVENOUS at 11:13

## 2025-04-25 NOTE — PROGRESS NOTES
Patient to infusion for venofer.  She tolerated treatment.  Next appointment 5/2 4520, she declined AVS

## 2025-05-02 ENCOUNTER — HOSPITAL ENCOUNTER (OUTPATIENT)
Dept: INFUSION CENTER | Facility: CLINIC | Age: 25
End: 2025-05-02
Payer: COMMERCIAL

## 2025-05-02 VITALS
OXYGEN SATURATION: 98 % | TEMPERATURE: 97.1 F | HEART RATE: 75 BPM | RESPIRATION RATE: 16 BRPM | DIASTOLIC BLOOD PRESSURE: 65 MMHG | SYSTOLIC BLOOD PRESSURE: 99 MMHG

## 2025-05-02 DIAGNOSIS — D50.0 IRON DEFICIENCY ANEMIA DUE TO CHRONIC BLOOD LOSS: Primary | ICD-10-CM

## 2025-05-02 PROCEDURE — 96365 THER/PROPH/DIAG IV INF INIT: CPT

## 2025-05-02 RX ORDER — SODIUM CHLORIDE 9 MG/ML
20 INJECTION, SOLUTION INTRAVENOUS ONCE
Status: COMPLETED | OUTPATIENT
Start: 2025-05-02 | End: 2025-05-02

## 2025-05-02 RX ORDER — SODIUM CHLORIDE 9 MG/ML
20 INJECTION, SOLUTION INTRAVENOUS ONCE
OUTPATIENT
Start: 2025-05-09

## 2025-05-02 RX ADMIN — SODIUM CHLORIDE 20 ML/HR: 0.9 INJECTION, SOLUTION INTRAVENOUS at 11:37

## 2025-05-02 RX ADMIN — IRON SUCROSE 200 MG: 20 INJECTION, SOLUTION INTRAVENOUS at 11:37

## 2025-05-02 NOTE — PROGRESS NOTES
Pt to clinic for venofer infusion. Pt tolerated without complication. Next appointment May 9 at 11:30

## 2025-05-09 ENCOUNTER — HOSPITAL ENCOUNTER (OUTPATIENT)
Dept: INFUSION CENTER | Facility: CLINIC | Age: 25
End: 2025-05-09
Payer: COMMERCIAL

## 2025-05-09 VITALS
OXYGEN SATURATION: 97 % | HEART RATE: 77 BPM | SYSTOLIC BLOOD PRESSURE: 104 MMHG | DIASTOLIC BLOOD PRESSURE: 71 MMHG | TEMPERATURE: 96.3 F

## 2025-05-09 DIAGNOSIS — D50.0 IRON DEFICIENCY ANEMIA DUE TO CHRONIC BLOOD LOSS: Primary | ICD-10-CM

## 2025-05-09 PROCEDURE — 96365 THER/PROPH/DIAG IV INF INIT: CPT

## 2025-05-09 RX ORDER — SODIUM CHLORIDE 9 MG/ML
20 INJECTION, SOLUTION INTRAVENOUS ONCE
Status: CANCELLED | OUTPATIENT
Start: 2025-05-09

## 2025-05-09 RX ORDER — SODIUM CHLORIDE 9 MG/ML
20 INJECTION, SOLUTION INTRAVENOUS ONCE
Status: COMPLETED | OUTPATIENT
Start: 2025-05-09 | End: 2025-05-09

## 2025-05-09 RX ADMIN — SODIUM CHLORIDE 20 ML/HR: 0.9 INJECTION, SOLUTION INTRAVENOUS at 11:48

## 2025-05-09 RX ADMIN — IRON SUCROSE 200 MG: 20 INJECTION, SOLUTION INTRAVENOUS at 11:50

## 2025-05-09 NOTE — PROGRESS NOTES
Patient presents to the Infusion Center for the treatment of Venofer. She offers no concerns at this time. PIV placed in her Right AC with good blood return. Vital signs stable. Patient is resting comfortably in the chair, call bell within reach.

## 2025-05-09 NOTE — PROGRESS NOTES
Patient tolerated treatment well with no adverse reactions. She has now completed all scheduled infusions. Declined AVS. Reviewed appointment on 07/15/2025 @ 1100 with ordering provider, aware to have labs drawn prior. Patient ambulatory at discharge.

## 2025-05-23 DIAGNOSIS — E28.2 PCOS (POLYCYSTIC OVARIAN SYNDROME): ICD-10-CM

## 2025-05-23 DIAGNOSIS — E66.01 MORBID OBESITY WITH BMI OF 40.0-44.9, ADULT (HCC): ICD-10-CM

## 2025-05-23 DIAGNOSIS — E78.5 DYSLIPIDEMIA (HIGH LDL; LOW HDL): ICD-10-CM

## 2025-05-27 DIAGNOSIS — E28.2 PCOS (POLYCYSTIC OVARIAN SYNDROME): ICD-10-CM

## 2025-05-27 DIAGNOSIS — E78.5 DYSLIPIDEMIA (HIGH LDL; LOW HDL): ICD-10-CM

## 2025-05-27 DIAGNOSIS — E66.01 MORBID OBESITY WITH BMI OF 40.0-44.9, ADULT (HCC): Primary | ICD-10-CM

## 2025-05-27 RX ORDER — TIRZEPATIDE 7.5 MG/.5ML
7.5 INJECTION, SOLUTION SUBCUTANEOUS WEEKLY
Qty: 2 ML | Refills: 0 | Status: SHIPPED | OUTPATIENT
Start: 2025-05-27 | End: 2025-07-22

## 2025-05-27 RX ORDER — TIRZEPATIDE 5 MG/.5ML
5 INJECTION, SOLUTION SUBCUTANEOUS WEEKLY
Qty: 2 ML | Refills: 0 | OUTPATIENT
Start: 2025-05-27 | End: 2025-08-19

## 2025-06-03 ENCOUNTER — CLINICAL SUPPORT (OUTPATIENT)
Age: 25
End: 2025-06-03

## 2025-06-03 VITALS — BODY MASS INDEX: 37.25 KG/M2 | HEIGHT: 65 IN | WEIGHT: 223.6 LBS

## 2025-06-03 DIAGNOSIS — R63.5 ABNORMAL WEIGHT GAIN: Primary | ICD-10-CM

## 2025-06-03 PROCEDURE — RECHECK

## 2025-06-03 PROCEDURE — WMDI30

## 2025-06-03 NOTE — PROGRESS NOTES
Weight Management Medical Nutrition Assessment    Abby presented for a meal planning session. Today's weight is 223#.      Per dietary recall patient consumes excess calories from prior portion sizes before starting Zepbound. Since starting in April, she has lost 12# and is feeling good.   4/17/2025 first dose and tolerating it with just a small bout of light headedness which is due to insufficient calorie intake. Otherwise, she is consuming a variety of foods and giving herself protein throughout the day. Fluid needs seem appropriate at this time as well. She is starting back up at the gym, hoping to go a few times during the week.    Developed and reviewed a low calorie meal plan:   Nutrition Prescription  Calories:4740-6584  Protein:85g  Fluid:86 fl oz    Meal Plan (Darwin/Pro/Carb)  Breakfast: 400 darwin/25g PRO  Snack: 100 darwin/10g PRO  Lunch: 400 darwin/25g PRO  Snack: 100 darwin/10g PRO  Dinner: 400 darwin/25g PRO  Snack: -    Patient seen by Medical Provider in past 6 months:  yes  Requested to schedule appointment with Medical Provider: Yes      Anthropometric Measurements  Start Weight (#) & Date: 3/28/25; 234#   Current Weight (#): 223#  TBW % Change from start weight:5%  Ideal Body Weight (#):125  Goal Weight (#):170-160#   Highest: 240#   Lowest: 180#     Weight Loss History  Previous weight loss attempts: Exercise  Self Created Diets (Portion Control, Healthy Food Choices, etc.)    Food and Nutrition Related History  Wake up: -   Bed Time:-    Food Recall  Breakfast:granola bar, eggs (2-3), potatoes (roasted with onions), toast (wheat) with butter 1 slice and valenzuela; water    Snack:yogurt covered berries, jerky, energy balls, yogurt   Lunch:wrap (spinach, turkey, lettuce, cheese, onion) or cheese and crackers with salami   Snack:same thing as before   Dinner:chicken stir diggs with rice and broccoli - limits her rice 3oz protein; 1/2 cup cooked rice; 1 cup veggies   Snack:-       Beverages: water, Body Armor   Volume  of beverage intake: 64 oz or more    Weekends: same   Cravings: none  Trouble area of day:-    Frequency of Eating out: biweekly  Food restrictions:none  Cooking: self   Food Shopping: self    Physical Activity Intake  Activity:Walking and Strength Training  Frequency:several times per week  Physical limitations/barriers to exercise: none    Estimated Needs  Energy  Sonia Alicia Energy Needs: BMR : 1762   1-2# loss weekly sedentary:  9070-0045             1-2# loss weekly lightly active:3119-4998  Maintenance calories for sedentary activity level: 2115  Protein:68-86g      (1.2-1.5g/kg IBW)  Fluid Requirement Calculator   Total Fluid: 106   oz  ([place]-Segar Method)  Free Fluid: 85 oz ([place]-Segar Method - 20%)    Nutrition Diagnosis  Yes;    Excessive energy intake  related to Food and nutrition related knowledge deficit concerning energy intake as evidenced by  BMI >25 for adults       Nutrition Intervention    Nutrition Prescription  Calories:6916-4133  Protein:85g  Fluid:86 fl oz    Meal Plan (Darwin/Pro/Carb)  Breakfast: 400 darwin/25g PRO  Snack: 100 darwin/10g PRO  Lunch: 400 darwin/25g PRO  Snack: 100 darwin/10g PRO  Dinner: 400 darwin/25g PRO  Snack: -    Nutrition Education:    Calorie controlled menu  Lean protein food choices  Healthy snack options  Food journaling tips      Nutrition Counseling:  Strategies: meal planning, portion sizes, healthy snack choices, hydration, fiber intake, protein intake, exercise, food journal      Monitoring and Evaluation:  Evaluation criteria:  Energy Intake  Meet protein needs  Maintain adequate hydration  Monitor weekly weight  Meal planning/preparation  Food journal   Decreased portions at mealtimes and snacks  Physical activity     Barriers to learning:none  Readiness to change: Action:  (Changing behavior)  Comprehension: very good  Expected Compliance: very good

## 2025-06-23 ENCOUNTER — OFFICE VISIT (OUTPATIENT)
Dept: OBGYN CLINIC | Facility: CLINIC | Age: 25
End: 2025-06-23
Payer: COMMERCIAL

## 2025-06-23 VITALS
SYSTOLIC BLOOD PRESSURE: 122 MMHG | WEIGHT: 215 LBS | HEIGHT: 65 IN | BODY MASS INDEX: 35.82 KG/M2 | DIASTOLIC BLOOD PRESSURE: 80 MMHG

## 2025-06-23 DIAGNOSIS — E66.01 MORBID OBESITY WITH BMI OF 40.0-44.9, ADULT (HCC): ICD-10-CM

## 2025-06-23 DIAGNOSIS — E28.2 PCOS (POLYCYSTIC OVARIAN SYNDROME): ICD-10-CM

## 2025-06-23 DIAGNOSIS — E78.5 DYSLIPIDEMIA (HIGH LDL; LOW HDL): ICD-10-CM

## 2025-06-23 DIAGNOSIS — N92.0 MENORRHAGIA WITH REGULAR CYCLE: ICD-10-CM

## 2025-06-23 DIAGNOSIS — Z30.45 ENCOUNTER FOR SURVEILLANCE OF TRANSDERMAL PATCH HORMONAL CONTRACEPTIVE DEVICE: Primary | ICD-10-CM

## 2025-06-23 PROCEDURE — 99213 OFFICE O/P EST LOW 20 MIN: CPT | Performed by: PHYSICIAN ASSISTANT

## 2025-06-23 RX ORDER — NORELGESTROMIN AND ETHINYL ESTRADIOL 35; 150 UG/MG; UG/MG
1 PATCH TRANSDERMAL WEEKLY
Qty: 9 PATCH | Refills: 2 | Status: SHIPPED | OUTPATIENT
Start: 2025-06-23

## 2025-06-23 NOTE — PATIENT INSTRUCTIONS
Refills of patch sent to pharmacy.    Practice consistent condom use for STD prevention.    Call with problems.

## 2025-06-23 NOTE — ASSESSMENT & PLAN NOTE
Orders:    norelgestromin-ethinyl estradiol (ORTHO EVRA) 150-35 MCG/24HR; Place 1 patch on the skin once a week over 7 days

## 2025-06-23 NOTE — PROGRESS NOTES
Name: Abby Hercules      : 2000      MRN: [id number]  Encounter Provider: Gloria Lee PA-C  Encounter Date: 2025   Encounter department: [facility] [place]  :  Assessment & Plan  Encounter for surveillance of transdermal patch hormonal contraceptive device         Menorrhagia with regular cycle    Orders:    norelgestromin-ethinyl estradiol (ORTHO EVRA) 150-35 MCG/24HR; Place 1 patch on the skin once a week over 7 days    PCOS (polycystic ovarian syndrome)    Orders:    norelgestromin-ethinyl estradiol (ORTHO EVRA) 150-35 MCG/24HR; Place 1 patch on the skin once a week over 7 days    Patient unable to leave urine sample for GC/chlamydia screening.  Refills of patch sent to pharmacy.  Stressed consistent condom use for STD prevention.  F/u in February for yearly gyn exam.  Call with problems in the interim.    History of Present Illness   Patient is here for birth control f/u.  Was started on the patch in March secondary to PCOS.  States she is doing well overall.  Periods are regular every 28 days, and bleeding lasts for 5 days.  She denies heavy bleeding, severe cramping, HA, and mood symptoms.  Patient has been sexually active with a new partner.  Denies bowel/bladder changes, pelvic pain, bloating, abdominal pain, n/v, and change in appetite.  Would like to remain on the patch.      Abby Hercules is a 25 y.o. female who presents for birth control f/u.  History obtained from: patient    Review of Systems   Constitutional:  Negative for appetite change and unexpected weight change.   Gastrointestinal:  Negative for abdominal distention, abdominal pain, constipation, diarrhea, nausea and vomiting.   Genitourinary:  Negative for difficulty urinating, dysuria, frequency, genital sores, hematuria, menstrual problem, pelvic pain, urgency, vaginal bleeding, vaginal discharge and vaginal pain.          Objective   /80 (BP Location: Left arm, Patient Position:  "Sitting, Cuff Size: Adult)   Ht 5' 5\" (1.651 m)   Wt 97.5 kg (215 lb)   LMP 06/09/2025 (Exact Date)   BMI 35.78 kg/m²      Physical Exam  Vitals reviewed.   Constitutional:       Appearance: Normal appearance. She is well-developed. She is obese.     Neurological:      Mental Status: She is alert and oriented to person, place, and time.     Psychiatric:         Mood and Affect: Mood normal.         Behavior: Behavior normal. Behavior is cooperative.         Thought Content: Thought content normal.         Judgment: Judgment normal.           "

## 2025-06-28 NOTE — TELEPHONE ENCOUNTER
Patient called answering service requesting update on prescription refill. Please follow up and advise. Thank you in advance.

## 2025-06-30 DIAGNOSIS — E78.5 DYSLIPIDEMIA (HIGH LDL; LOW HDL): ICD-10-CM

## 2025-06-30 DIAGNOSIS — E28.2 PCOS (POLYCYSTIC OVARIAN SYNDROME): ICD-10-CM

## 2025-06-30 DIAGNOSIS — E66.01 MORBID OBESITY WITH BMI OF 40.0-44.9, ADULT (HCC): Primary | ICD-10-CM

## 2025-06-30 RX ORDER — TIRZEPATIDE 7.5 MG/.5ML
7.5 INJECTION, SOLUTION SUBCUTANEOUS WEEKLY
Qty: 2 ML | Refills: 0 | OUTPATIENT
Start: 2025-06-30 | End: 2025-08-25

## 2025-06-30 RX ORDER — TIRZEPATIDE 10 MG/.5ML
10 INJECTION, SOLUTION SUBCUTANEOUS WEEKLY
Qty: 2 ML | Refills: 1 | Status: SHIPPED | OUTPATIENT
Start: 2025-06-30 | End: 2025-10-20

## 2025-07-09 ENCOUNTER — TELEPHONE (OUTPATIENT)
Dept: HEMATOLOGY ONCOLOGY | Facility: MEDICAL CENTER | Age: 25
End: 2025-07-09

## 2025-07-11 ENCOUNTER — TELEPHONE (OUTPATIENT)
Age: 25
End: 2025-07-11

## 2025-07-11 NOTE — TELEPHONE ENCOUNTER
Patient called because she has not taken her Zepbound since script was sent to pharmacy for refill and was unsure if it is safe to restart it. Transferred to [facility].